# Patient Record
Sex: FEMALE | URBAN - METROPOLITAN AREA
[De-identification: names, ages, dates, MRNs, and addresses within clinical notes are randomized per-mention and may not be internally consistent; named-entity substitution may affect disease eponyms.]

---

## 2022-06-01 ENCOUNTER — APPOINTMENT (RX ONLY)
Dept: URBAN - METROPOLITAN AREA CLINIC 6 | Facility: CLINIC | Age: 67
Setting detail: DERMATOLOGY
End: 2022-06-01

## 2022-06-01 DIAGNOSIS — Z85.820 PERSONAL HISTORY OF MALIGNANT MELANOMA OF SKIN: ICD-10-CM

## 2022-06-01 PROBLEM — D37.01 NEOPLASM OF UNCERTAIN BEHAVIOR OF LIP: Status: ACTIVE | Noted: 2022-06-01

## 2022-06-01 PROCEDURE — ? ADDITIONAL NOTES

## 2022-06-01 PROCEDURE — ? BIOPSY BY SHAVE METHOD

## 2022-06-01 PROCEDURE — 99202 OFFICE O/P NEW SF 15 MIN: CPT | Mod: 25

## 2022-06-01 PROCEDURE — 40490 BIOPSY OF LIP: CPT

## 2022-06-01 PROCEDURE — ? COUNSELING

## 2022-06-01 ASSESSMENT — LOCATION ZONE DERM: LOCATION ZONE: ARM

## 2022-06-01 ASSESSMENT — LOCATION SIMPLE DESCRIPTION DERM: LOCATION SIMPLE: LEFT SHOULDER

## 2022-06-01 ASSESSMENT — LOCATION DETAILED DESCRIPTION DERM: LOCATION DETAILED: LEFT LATERAL SHOULDER

## 2022-06-01 NOTE — PROCEDURE: MIPS QUALITY
Quality 137: Melanoma: Continuity Of Care - Recall System: Patient information entered into a recall system that includes: target date for the next exam specified AND a process to follow up with patients regarding missed or unscheduled appointments
Detail Level: Detailed
Quality 226: Preventive Care And Screening: Tobacco Use: Screening And Cessation Intervention: Patient screened for tobacco use, is a smoker AND received Cessation Counseling within the Previous 12 Months
Quality 111:Pneumonia Vaccination Status For Older Adults: Pneumococcal vaccine was not administered on or after patient’s 60th birthday and before the end of the measurement period, reason not otherwise specified
Quality 130: Documentation Of Current Medications In The Medical Record: Current Medications Documented
Quality 138: Melanoma: Coordination Of Care: A treatment plan was communicated to the physicians providing continuing care within one month of diagnosis outlining: diagnosis, tumor thickness and a plan for surgery or alternate care.
Quality 397: Melanoma: Reporting: The pathology report includes a pT Category and statement on thickness and ulceration for pT1, mitotic rate.

## 2022-06-01 NOTE — PROCEDURE: BIOPSY BY SHAVE METHOD
Detail Level: Detailed
Depth Of Biopsy: dermis
Was A Bandage Applied: Yes
Size Of Lesion In Cm: 1
Biopsy Type: H and E
Biopsy Method: Dermablade
Anesthesia Type: 1% lidocaine with epinephrine
Anesthesia Volume In Cc: 0.5
Additional Anesthesia Volume In Cc (Will Not Render If 0): 0
Hemostasis: Drysol
Wound Care: Petrolatum
Dressing: bandage
Destruction After The Procedure: No
Type Of Destruction Used: Curettage
Curettage Text: The wound bed was treated with curettage after the biopsy was performed.
Cryotherapy Text: The wound bed was treated with cryotherapy after the biopsy was performed.
Electrodesiccation Text: The wound bed was treated with electrodesiccation after the biopsy was performed.
Electrodesiccation And Curettage Text: The wound bed was treated with electrodesiccation and curettage after the biopsy was performed.
Silver Nitrate Text: The wound bed was treated with silver nitrate after the biopsy was performed.
Lab: [id number]
Lab Facility: 
Consent: Written consent was obtained and risks were reviewed including but not limited to scarring, infection, bleeding, scabbing, incomplete removal, nerve damage and allergy to anesthesia.
Post-Care Instructions: I reviewed with the patient in detail post-care instructions. Patient is to keep the biopsy site dry overnight, and then apply bacitracin twice daily until healed. Patient may apply hydrogen peroxide soaks to remove any crusting.
Notification Instructions: Patient will be notified of biopsy results. However, patient instructed to call the office if not contacted within 2 weeks.
Billing Type: Third-Party Bill
Information: Selecting Yes will display possible errors in your note based on the variables you have selected. This validation is only offered as a suggestion for you. PLEASE NOTE THAT THE VALIDATION TEXT WILL BE REMOVED WHEN YOU FINALIZE YOUR NOTE. IF YOU WANT TO FAX A PRELIMINARY NOTE YOU WILL NEED TO TOGGLE THIS TO 'NO' IF YOU DO NOT WANT IT IN YOUR FAXED NOTE.

## 2022-06-01 NOTE — PROCEDURE: ADDITIONAL NOTES
Render Risk Assessment In Note?: no
Detail Level: Simple
Additional Notes: Patient had large SCC on right lower lip surgically excised by an oral surgeon in 2017. Margins were clear at that time. About 6 months ago the lesion returned and has been gradually growing.

## 2022-06-01 NOTE — HPI: SKIN LESION
Is This A New Presentation, Or A Follow-Up?: Skin Lesion
What Type Of Note Output Would You Prefer (Optional)?: Bullet Format
How Severe Is Your Skin Lesion?: severe
Has Your Skin Lesion Been Treated?: been treated
Additional History: Recurrence of previously treated SCC on right lower lip.
When Was It Treated?: 7/5/22

## 2022-06-08 ENCOUNTER — APPOINTMENT (RX ONLY)
Dept: URBAN - METROPOLITAN AREA CLINIC 6 | Facility: CLINIC | Age: 67
Setting detail: DERMATOLOGY
End: 2022-06-08

## 2022-06-08 PROBLEM — C00.1 MALIGNANT NEOPLASM OF EXTERNAL LOWER LIP: Status: ACTIVE | Noted: 2022-06-08

## 2022-06-08 PROCEDURE — ? COUNSELING

## 2022-06-08 PROCEDURE — ? DIAGNOSIS COMMENT

## 2022-06-08 PROCEDURE — 99212 OFFICE O/P EST SF 10 MIN: CPT

## 2022-06-08 NOTE — PROCEDURE: DIAGNOSIS COMMENT
Detail Level: Simple
Comment: Biopsy proven [id number]. \\nPatient was originally against having another surgery on this location, but after extensive discussion regarding high risk for metastasis given location on lip, recurrent SCC, and size of lesion, patient ultimately agreed to Mohs procedure. \\nShe states she needs “some more time to process” and also would like to have colonoscopy and PET scan performed (ordered by her PCP for positive cologuard) before focusing on treatment for this SCC.\\nReiterated importance of treating this quickly.  \\nWe will submit information to Mohs scheduling to keep this moving forward.\\nNo lymphadenopathy present on exam at today’s visit.
Render Risk Assessment In Note?: no

## 2022-06-08 NOTE — PROCEDURE: MIPS QUALITY
Detail Level: Detailed
Quality 226: Preventive Care And Screening: Tobacco Use: Screening And Cessation Intervention: Patient screened for tobacco use and is an ex/non-smoker
Quality 111:Pneumonia Vaccination Status For Older Adults: Pneumococcal vaccine was not administered on or after patient’s 60th birthday and before the end of the measurement period, reason not otherwise specified
Quality 130: Documentation Of Current Medications In The Medical Record: Current Medications Documented

## 2022-07-06 ENCOUNTER — APPOINTMENT (RX ONLY)
Dept: URBAN - METROPOLITAN AREA CLINIC 36 | Facility: CLINIC | Age: 67
Setting detail: DERMATOLOGY
End: 2022-07-06

## 2022-07-06 DIAGNOSIS — Z85.820 PERSONAL HISTORY OF MALIGNANT MELANOMA OF SKIN: ICD-10-CM

## 2022-07-06 PROBLEM — C44.92 SQUAMOUS CELL CARCINOMA OF SKIN, UNSPECIFIED: Status: ACTIVE | Noted: 2022-07-06

## 2022-07-06 PROCEDURE — ? COUNSELING

## 2022-07-06 PROCEDURE — 99214 OFFICE O/P EST MOD 30 MIN: CPT

## 2022-07-06 PROCEDURE — ? PATIENT SPECIFIC COUNSELING

## 2022-07-06 NOTE — PROCEDURE: PATIENT SPECIFIC COUNSELING
Detail Level: Zone
Mohs surgery vs radiation \\n\\nPt states she is hesitant with moving forward with surgery. Patient was advised of all the benefits risks and alternatives of her options. All questions were answered by Dr. Odom. Pt advised to move forward with radiation consultation. [facility]\\n20 minutes face to face\\nAnother 10’ talking to [facility]

## 2022-07-06 NOTE — PROCEDURE: MIPS QUALITY
Quality 138: Melanoma: Coordination Of Care: A treatment plan was communicated to the physicians providing continuing care within one month of diagnosis outlining: diagnosis, tumor thickness and a plan for surgery or alternate care.
Quality 226: Preventive Care And Screening: Tobacco Use: Screening And Cessation Intervention: Patient screened for tobacco use and is an ex/non-smoker
Quality 137: Melanoma: Continuity Of Care - Recall System: Patient information entered into a recall system that includes: target date for the next exam specified AND a process to follow up with patients regarding missed or unscheduled appointments
Detail Level: Detailed
Quality 111:Pneumonia Vaccination Status For Older Adults: Pneumococcal Vaccination Previously Received
Quality 130: Documentation Of Current Medications In The Medical Record: Current Medications Documented

## 2022-07-12 ENCOUNTER — APPOINTMENT (RX ONLY)
Dept: URBAN - METROPOLITAN AREA CLINIC 6 | Facility: CLINIC | Age: 67
Setting detail: DERMATOLOGY
End: 2022-07-12

## 2022-07-12 PROBLEM — C00.1 MALIGNANT NEOPLASM OF EXTERNAL LOWER LIP: Status: ACTIVE | Noted: 2022-07-12

## 2022-07-12 PROCEDURE — ? COUNSELING

## 2022-07-12 PROCEDURE — 99213 OFFICE O/P EST LOW 20 MIN: CPT

## 2022-07-12 PROCEDURE — ? DIAGNOSIS COMMENT

## 2022-07-12 NOTE — PROCEDURE: DIAGNOSIS COMMENT
Detail Level: Simple
Render Risk Assessment In Note?: no
Comment: Accession # [id number]. Patient has been referred to Dr. Arredondo’s office for radiation treatment.\\nPatient declines FBE as previously scheduled and would like to reschedule for another time.

## 2022-08-02 PROBLEM — C00.1: Status: ACTIVE | Noted: 2022-08-02

## 2022-08-05 ENCOUNTER — HOSPITAL ENCOUNTER (OUTPATIENT)
Dept: RADIATION ONCOLOGY | Facility: MEDICAL CENTER | Age: 67
End: 2022-08-31
Attending: RADIOLOGY
Payer: MEDICARE

## 2022-08-05 VITALS — OXYGEN SATURATION: 96 % | HEART RATE: 91 BPM | WEIGHT: 146.61 LBS

## 2022-08-05 DIAGNOSIS — C00.1 SQUAMOUS CELL CARCINOMA OF VERMILION BORDER OF LOWER LIP: ICD-10-CM

## 2022-08-05 PROCEDURE — 99214 OFFICE O/P EST MOD 30 MIN: CPT | Performed by: RADIOLOGY

## 2022-08-05 PROCEDURE — 99205 OFFICE O/P NEW HI 60 MIN: CPT | Performed by: RADIOLOGY

## 2022-08-05 NOTE — CT SIMULATION
PATIENT NAME Whitney Cotton   PRIMARY PHYSICIAN Halina Louise [id number]   REFERRING PHYSICIAN Olu Dillon M.D. 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 on 8/2/2022  Stage prefix: Initial diagnosis         Treatment Planning CT Simulation      Order Questions     Question Answer    Is this for a new course of treatment? Yes    Is this an Addendum? No    Simulation Status Initial    Treatment Technique Electron Beam    Other Technique(s) Electron    Treatment Pattern/Frequency Daily    Concurrent Chemotherapy No    CT Technique 3D    Slice Thickness 3mm    Scan Extent H&N    Treatment Device(s) Aquaplast Mask     Bolus 0.5 cm    Patient Attire Gown    Patient Position Supine    Patient Orientation Head First    Treatment Machine No preference    In Vivo Dosimetry TLD    Other Orders Weekly Physics Check

## 2022-08-05 NOTE — ADDENDUM NOTE
Encounter addended by: Simi Feliciano M.D. on: 8/5/2022 10:35 AM   Actions taken: Clinical Note Signed

## 2022-08-05 NOTE — CONSULTS
RADIATION ONCOLOGY CONSULT    DATE OF SERVICE: 8/5/2022    IDENTIFICATION: A 66 y.o. 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  She is here at the kind request of Dr. Olu Dillon.      HISTORY OF PRESENT ILLNESS: Patient's history dates back to 2017 when she was found to have squamous cell carcinoma of the lateral lower right vermilion lip.  This was excised with negative margins.  More recently it had recurred she underwent to shave biopsy on 6/1/2022.  This revealed well differentiated squamous cell carcinoma.  She was seen by Dr. Olu Farias 7/6/2022 regarding Mohs.  She is concerned about having further surgery so she was referred here for definitive management of her cancer with radiation therapy. Currently she is doing well without any major complaints.    PAST MEDICAL HISTORY:   Past Medical History:   Diagnosis Date   •  VENOUS REFLUX LEFT LEG 1/21/2011   • Arm pain     chronic lefr upper arm & lateral back pain   • Cancer (HCC)     h/o cervical.  Pt self treated w/ holistic Rx   • GERD (gastroesophageal reflux disease)    • Malignant melanoma nos     h/o s/p Sx       PAST SURGICAL HISTORY:  History of melanoma left arm treated many years ago.  Vertigo  Year arthritis in her hands  GERD  Hypertension    GYNECOLOGICAL STATUS:  G3, P3    CURRENT MEDICATIONS:  Current Outpatient Medications   Medication Sig Dispense Refill   • cyclobenzaprine (FLEXERIL) 5 MG tablet Take 1 Tab by mouth 2 times a day as needed for Mild Pain. 60 Tab 3   • hydrocodone-acetaminophen (VICODIN) 5-500 MG TABS Take 1-2 Tabs by mouth every four hours as needed for 20 doses. 20 Each 0   • ibuprofen (MOTRIN) 800 MG TABS Take 1 Tab by mouth 2 times a day as needed for Mild Pain. 60 Each 1   • ranitidine (ZANTAC) 150 MG TABS Take 1 Tab by mouth 2 times a day. 60 Each 11   • amitriptyline (ELAVIL) 25 MG TABS Take 1 Tab by mouth every evening. FOR PAIN 30 Each 3   • cyclobenzaprine (FLEXERIL) 5 MG  tablet Take 1 Tab by mouth 3 times a day as needed for Mild Pain. 30 Tab 0   • azithromycin (ZITHROMAX Z-RACHELE) 250 MG TABS Take 1 Tab by mouth every day. 2 tabs by mouth day 1, 1 tab by mouth days 2-5 1 Each 0   • AMITRIPTYLINE HCL 50 MG PO TABS Take 50 mg by mouth every evening.       No current facility-administered medications for this encounter.       ALLERGIES:    Penicillins, Asa [aspirin], Codeine, Tramadol, and Valium    FAMILY HISTORY:    [unfilled]    SOCIAL HISTORY:     reports that she has been smoking cigarettes. She started smoking about 42 years ago. She has a 5.00 pack-year smoking history. She has never used smokeless tobacco. She reports previous alcohol use. She reports current drug use. Drug: Marijuana.  Patient lives with a daughter and son and grandchildren.  She is .  She plans to move back to [place] when she is completed her treatment to live with her other family    REVIEW OF SYSTEMS: Pertinent positives consist of  alteration in taste, vertigo  The rest of the review of systems is negative and has been reviewed.     PAIN: None      PHYSICAL EXAM:    0= Fully active, able to carry on all pre-disease performance without restriction.  [unfilled]    GENERAL: Well-appearing alert and oriented x3 in no major distress.  Skin: Multiple skin changes consistent with sun exposure.  She has evidence of the very large excision on the left deltoid from the prior melanoma.  HEENT:  Pupils are equal, round, and reactive to light.  Extraocular muscles   are intact. Sclerae nonicteric.  Conjunctivae pink.  Oral cavity, tongue   protrudes midline. Lower lip shows evidence of a prior resection in the right lower vermilion lip.  There is  an associated recurrence which appears to be about 1 cm in greatest dimension.  NECK:   No peripheral adenopathy of the neck, supraclavicular fossa or axillae   bilaterally.Specifically there is no palpable lymphadenopathy in the submental or submandibular  regions.  LUNGS:  Clear to ascultation   HEART:  Regular rate and rhythm.  No murmur appreciated  ABDOMEN:  Soft. No evidence of hepatosplenomegaly.    EXTREMITIES:  Without Edema.  NEUROLOGIC:  Cranial nerves II through XII were intact. Normal stance and gait motor and sensory grossly within normal limits                IMPRESSION:    A 66 y.o. 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      RECOMMENDATIONS:   I had a long discussion with the patient regarding diagnosis prognosis and treatment.  I think radiation therapy for definitive management is reasonable.  I would like to get a PET CT scan just to make sure that this is localized only to that area since it is a recurrent lesion and there is a slight concern for metastasis to the submental and submandibular lymph nodes.  I've described the details of radiation along with the side effects both acute and chronic, including but not exclusive to fatigue, skin reaction, local soreness, swelling, delayed healing, scarring fibrosis discoloration. Ample time was allowed for questions, and patient understands.    I will see patient back in follow-up after PET CT. At that point if she decides to proceed with radiation therapy we will do so.    Thank you for the opportunity to participate in her care.  If any questions or comments, please do not hesitate in calling.    Please note that this dictation was created using voice recognition software. I have made every reasonable attempt to correct obvious errors, but I expect that there are errors of grammar and possibly content that I did not discover before finalizing the note.

## 2022-08-10 ENCOUNTER — HOSPITAL ENCOUNTER (OUTPATIENT)
Dept: RADIOLOGY | Facility: MEDICAL CENTER | Age: 67
End: 2022-08-10
Attending: RADIOLOGY
Payer: MEDICARE

## 2022-08-10 DIAGNOSIS — C00.1 SQUAMOUS CELL CARCINOMA OF VERMILION BORDER OF LOWER LIP: ICD-10-CM

## 2022-08-10 PROCEDURE — A9552 F18 FDG: HCPCS

## 2022-08-16 ENCOUNTER — APPOINTMENT (OUTPATIENT)
Dept: RADIATION ONCOLOGY | Facility: MEDICAL CENTER | Age: 67
End: 2022-08-16
Attending: RADIOLOGY
Payer: MEDICARE

## 2022-08-23 VITALS — HEART RATE: 79 BPM | OXYGEN SATURATION: 98 % | SYSTOLIC BLOOD PRESSURE: 123 MMHG | DIASTOLIC BLOOD PRESSURE: 72 MMHG

## 2022-08-23 DIAGNOSIS — C00.1 SQUAMOUS CELL CARCINOMA OF VERMILION BORDER OF LOWER LIP: ICD-10-CM

## 2022-08-23 PROCEDURE — 99212 OFFICE O/P EST SF 10 MIN: CPT | Performed by: RADIOLOGY

## 2022-08-23 PROCEDURE — 99214 OFFICE O/P EST MOD 30 MIN: CPT | Performed by: RADIOLOGY

## 2022-08-23 NOTE — NON-PROVIDER
Patient was seen today in clinic with Dr. Feliciano for follow up.  Vitals signs and weight were obtained and pain assessment was completed.  Allergies and medications were reviewed with the patient.  Toxicities of treatment assessed.     Vitals/Pain:  Vitals:    08/23/22 0808   BP: 123/72   BP Location: Left arm   Patient Position: Sitting   Pulse: 79   SpO2: 98%            Allergies:   Penicillins, Asa [aspirin], Codeine, Tramadol, and Valium    Current Medications:  Current Outpatient Medications   Medication Sig Dispense Refill    cyclobenzaprine (FLEXERIL) 5 MG tablet Take 1 Tab by mouth 2 times a day as needed for Mild Pain. 60 Tab 3    hydrocodone-acetaminophen (VICODIN) 5-500 MG TABS Take 1-2 Tabs by mouth every four hours as needed for 20 doses. 20 Each 0    ibuprofen (MOTRIN) 800 MG TABS Take 1 Tab by mouth 2 times a day as needed for Mild Pain. 60 Each 1    ranitidine (ZANTAC) 150 MG TABS Take 1 Tab by mouth 2 times a day. 60 Each 11    amitriptyline (ELAVIL) 25 MG TABS Take 1 Tab by mouth every evening. FOR PAIN 30 Each 3    cyclobenzaprine (FLEXERIL) 5 MG tablet Take 1 Tab by mouth 3 times a day as needed for Mild Pain. 30 Tab 0    azithromycin (ZITHROMAX Z-RACHELE) 250 MG TABS Take 1 Tab by mouth every day. 2 tabs by mouth day 1, 1 tab by mouth days 2-5 1 Each 0    AMITRIPTYLINE HCL 50 MG PO TABS Take 50 mg by mouth every evening.       No current facility-administered medications for this encounter.           Efrain Romero [employer]

## 2022-08-23 NOTE — PROGRESS NOTES
RADIATION ONCOLOGY FOLLOW-UP    DATE OF SERVICE: 8/23/2022    IDENTIFICATION:   A 66 y.o. female with ecurrent squamous cell carcinoma of the lateral lower right vermillion lip.    Here to review results of her PET CT scan.    HISTORY OF PRESENT ILLNESS:   Patient is doing well no new complaints.  She did have a birth in the family and a death in the family since she was last seen and had reschedule her appointment from last week.  PET CT scan done 8/10/2022 shows mild uptake in the anterior lower lip region presumably related to the area of known recurrence.  No abnormal uptake to suggest metastatic cervical lymphadenopathy.  Nonspecific uptake within the left nasopharynx with associated asymmetric tissue direct visualization is recommended.  Mild uptake in the thyroid gland probably physiologic with no focal nodules.  Uptake in the anterior mandible consistent with dental disease.  No evidence of metastatic disease in chest abdomen or pelvis.    CURRENT MEDICATIONS:  Current Outpatient Medications   Medication Sig Dispense Refill    cyclobenzaprine (FLEXERIL) 5 MG tablet Take 1 Tab by mouth 2 times a day as needed for Mild Pain. 60 Tab 3    hydrocodone-acetaminophen (VICODIN) 5-500 MG TABS Take 1-2 Tabs by mouth every four hours as needed for 20 doses. 20 Each 0    ibuprofen (MOTRIN) 800 MG TABS Take 1 Tab by mouth 2 times a day as needed for Mild Pain. 60 Each 1    ranitidine (ZANTAC) 150 MG TABS Take 1 Tab by mouth 2 times a day. 60 Each 11    amitriptyline (ELAVIL) 25 MG TABS Take 1 Tab by mouth every evening. FOR PAIN 30 Each 3    cyclobenzaprine (FLEXERIL) 5 MG tablet Take 1 Tab by mouth 3 times a day as needed for Mild Pain. 30 Tab 0    azithromycin (ZITHROMAX Z-RACHELE) 250 MG TABS Take 1 Tab by mouth every day. 2 tabs by mouth day 1, 1 tab by mouth days 2-5 1 Each 0    AMITRIPTYLINE HCL 50 MG PO TABS Take 50 mg by mouth every evening.       No current facility-administered medications for this encounter.        ALLERGIES:  Penicillins, Asa [aspirin], Codeine, Tramadol, and Valium    FAMILY HISTORY:    No family history on file.[unfilled]        SOCIAL HISTORY:     reports that she has been smoking cigarettes. She started smoking about 42 years ago. She has a 5.00 pack-year smoking history. She has never used smokeless tobacco. She reports that she does not currently use alcohol. She reports current drug use. Drug: Marijuana.    PAIN: None    REVIEW OF SYSTEMS: Is significant for Fatigue alteration in taste dental issues joint pain joint swelling and anxiety.  She has unsteady gait and she has a history of seizures she states which she treats naturally with no medication.  The rest of the review of systems has been reviewed.    PHYSICAL EXAM:     ECOG PERFORMANCE STATUS:  0= Fully active, able to carry on all pre-disease performance without restriction.   Vitals:    08/23/22 0808   BP: 123/72   BP Location: Left arm   Patient Position: Sitting   Pulse: 79   SpO2: 98%            GENERAL: Well-appearing alert and oriented x3 no apparent distress  Skin of face: Still right lower lip has evidence of tumor recurrence.  HEENT:  Pupils are equal, round, and reactive to light.  Extraocular muscles   are intact. Sclerae nonicteric.  Conjunctivae pink.  Oral cavity, tongue   protrudes midline.   NECK:  No preauricular neck supraclavicular axillary adenopathy.  LUNGS:  Clear to ascultation   HEART:  Regular rate and rhythm.  No murmur appreciated  ABDOMEN:  Soft. No evidence of hepatosplenomegaly.   EXTREMITIES:  Without Edema.  NEUROLOGIC:  Cranial nerves II through XII were intact. Normal stance and gait motor and sensory grossly within normal limits          IMPRESSION:    A 66 y.o. with ecurrent squamous cell carcinoma of the lateral lower right vermillion lip.  No evidence of metastatic disease.    RECOMMENDATIONS:     I am recommending definitive radiation therapy to the right lower lip.  Risks and benefits were discussed  again in detail and we have her tentatively scheduled for simulation next week to get started soon thereafter.        Thank you for the opportunity to participate in her care.  If any questions or comments, please do not hesitate in calling.      Please note that this dictation was created using voice recognition software. I have made every reasonable attempt to correct obvious errors, but I expect that there are errors of grammar and possibly content that I did not discover before finalizing the note.

## 2022-08-23 NOTE — CT SIMULATION
PATIENT NAME Whitney Cotton   PRIMARY PHYSICIAN Halina Louise [id number]   REFERRING PHYSICIAN Olu Dillon M.D. 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 on 8/2/2022  Stage prefix: Initial diagnosis         Treatment Planning CT Simulation        Order Questions       Question Answer    Is this for a new course of treatment? Yes    Is this an Addendum? No    Simulation Status Initial    Treatment Technique Electron Beam    Other Technique(s) Electron    Treatment Pattern/Frequency Daily    Concurrent Chemotherapy No    CT Technique 3D    Slice Thickness 3mm    Scan Extent H&N    Treatment Device(s) Aquaplast Mask     Custom    Patient Attire Gown    Patient Position Supine    Patient Orientation Head First    Treatment Machine No preference    In Vivo Dosimetry TLD    Other Orders Weekly Physics Check

## 2022-08-29 ENCOUNTER — PATIENT OUTREACH (OUTPATIENT)
Dept: ONCOLOGY | Facility: MEDICAL CENTER | Age: 67
End: 2022-08-29
Payer: MEDICARE

## 2022-08-29 ENCOUNTER — HOSPITAL ENCOUNTER (OUTPATIENT)
Dept: RADIATION ONCOLOGY | Facility: MEDICAL CENTER | Age: 67
End: 2022-08-29

## 2022-08-29 DIAGNOSIS — C00.1 SQUAMOUS CELL CARCINOMA OF VERMILION BORDER OF LOWER LIP: ICD-10-CM

## 2022-08-29 PROCEDURE — 77334 RADIATION TREATMENT AID(S): CPT | Performed by: RADIOLOGY

## 2022-08-29 PROCEDURE — 77334 RADIATION TREATMENT AID(S): CPT | Mod: 26 | Performed by: RADIOLOGY

## 2022-08-29 PROCEDURE — 77290 THER RAD SIMULAJ FIELD CPLX: CPT | Performed by: RADIOLOGY

## 2022-08-29 PROCEDURE — 77263 THER RADIOLOGY TX PLNG CPLX: CPT | Performed by: RADIOLOGY

## 2022-08-29 PROCEDURE — 77290 THER RAD SIMULAJ FIELD CPLX: CPT | Mod: 26 | Performed by: RADIOLOGY

## 2022-08-29 NOTE — PROGRESS NOTES
"[facility] Intake    Diagnosis Type:Squamous cell carcinoma of skin  Preferred Language:English  Insurance:Medicare & Medicaid  Dental Insurance:Yes; Last Appointment Date: \"about 20 years ago\"   Primary Care Provider Reviewed: yes  Last Appointment Date: \"July 2022 with Halina Louise\"  Introduced Duong Cotton to Oncology Support Services and provided contact information on 8/29/2022 .  Care team: Mariluz Rankin at [facility]   Current Barriers Identified Include: Transportation & food security  MyChart Status: Pending Activation, sent activation code via text.  Communication Preferences: Phones calls, LVM and will return call; Best Contact Number: [phone]  Patient Contact's Reviewed: yes , updated.    Met with pt. In RTD Consult Room #1 post [facility] appointment for intake. Pt. States she has family as support system. Educated pt. On [employer] web page, provided Cancer Support Group Calendar, and reviewed the Resource Center. Pt. Stated she needs gas assistance as she \"can not be a passenger with vertigo\", has questions about diet, deals with \"anxiety and depression\", and worries about food cost. Pt. Stated she receives EBT and it does not cover all food cost. Pt. Provided [facility] resource for gas mileage reimbursement log and [facility] nutrition services for nutrition advisor. Pt. Inquired if she is able to take supplements such as tumeric and collagen, during treatment. Informed pt. That I would place a referral for ONN and OSW for assessment. No further needs at this time.     Outcome:  Placed referral to ONN and OSW.         "

## 2022-09-01 ENCOUNTER — HOSPITAL ENCOUNTER (OUTPATIENT)
Dept: RADIATION ONCOLOGY | Facility: MEDICAL CENTER | Age: 67
End: 2022-09-30
Attending: RADIOLOGY
Payer: MEDICARE

## 2022-09-01 PROCEDURE — 77307 TELETHX ISODOSE PLAN CPLX: CPT | Mod: 26 | Performed by: RADIOLOGY

## 2022-09-01 PROCEDURE — 77334 RADIATION TREATMENT AID(S): CPT | Mod: 26 | Performed by: RADIOLOGY

## 2022-09-01 PROCEDURE — 77333 RADIATION TREATMENT AID(S): CPT | Mod: XU | Performed by: RADIOLOGY

## 2022-09-01 PROCEDURE — 77334 RADIATION TREATMENT AID(S): CPT | Performed by: RADIOLOGY

## 2022-09-01 PROCEDURE — 77333 RADIATION TREATMENT AID(S): CPT | Mod: 26,XU | Performed by: RADIOLOGY

## 2022-09-01 PROCEDURE — 77307 TELETHX ISODOSE PLAN CPLX: CPT | Performed by: RADIOLOGY

## 2022-09-06 ENCOUNTER — HOSPITAL ENCOUNTER (OUTPATIENT)
Dept: RADIATION ONCOLOGY | Facility: MEDICAL CENTER | Age: 67
End: 2022-09-06

## 2022-09-06 LAB
CHEMOTHERAPY INFUSION START DATE: NORMAL
CHEMOTHERAPY RECORDS: 2
CHEMOTHERAPY RECORDS: 6600
CHEMOTHERAPY RECORDS: NORMAL
CHEMOTHERAPY RX CANCER: NORMAL
DATE 1ST CHEMO CANCER: NORMAL
RAD ONC ARIA COURSE LAST TREATMENT DATE: NORMAL
RAD ONC ARIA COURSE TREATMENT ELAPSED DAYS: NORMAL
RAD ONC ARIA REFERENCE POINT DOSAGE GIVEN TO DATE: 2
RAD ONC ARIA REFERENCE POINT DOSAGE GIVEN TO DATE: 2.32
RAD ONC ARIA REFERENCE POINT ID: NORMAL
RAD ONC ARIA REFERENCE POINT ID: NORMAL
RAD ONC ARIA REFERENCE POINT SESSION DOSAGE GIVEN: 2
RAD ONC ARIA REFERENCE POINT SESSION DOSAGE GIVEN: 2.32

## 2022-09-06 PROCEDURE — 77412 RADIATION TX DELIVERY LVL 3: CPT | Performed by: RADIOLOGY

## 2022-09-06 PROCEDURE — 77280 THER RAD SIMULAJ FIELD SMPL: CPT | Performed by: RADIOLOGY

## 2022-09-06 NOTE — CT SIMULATION
DATE OF SERVICE: 9/6/2022    Radiation Therapy Episodes       Active Episodes       Radiation Therapy: Electron Beam    Radiation Therapy: Electron Beam                   Radiation Treatments         Plan Last Treated On Elapsed Days Fractions Treated Prescribed Fraction Dose (cGy) Prescribed Total Dose (cGy)    R_Lower_Lip 9/6/2022 0 @ [id number] 1 of 33 200 6,600                  Reference Point Last Treated On Elapsed Days Most Recent Session Dose (cGy) Total Dose (cGy)    R_Lower_Lip 9/6/2022 0 @ [id number] 200 200    R_Lower_Lip CP 9/6/2022 0 @ [id number] 232 232                            First Visit Simple Simulation: Called by [facility] machine to verify treatment parameters including:  treatment site, treatment dose, and treatment setup prior to first treatment. Set up derived shifts reviewed in all appropriate plains.  Shifts approved.  Patient treated.    I have personally reviewed the relevant data, performed the target localization, and determined all relevant factors for this patient’s simulation.

## 2022-09-07 ENCOUNTER — HOSPITAL ENCOUNTER (OUTPATIENT)
Dept: RADIATION ONCOLOGY | Facility: MEDICAL CENTER | Age: 67
End: 2022-09-07
Payer: MEDICARE

## 2022-09-07 VITALS — SYSTOLIC BLOOD PRESSURE: 145 MMHG | HEART RATE: 89 BPM | OXYGEN SATURATION: 96 % | DIASTOLIC BLOOD PRESSURE: 98 MMHG

## 2022-09-07 LAB
CHEMOTHERAPY INFUSION START DATE: NORMAL
CHEMOTHERAPY RECORDS: 2
CHEMOTHERAPY RECORDS: 6600
CHEMOTHERAPY RECORDS: NORMAL
CHEMOTHERAPY RX CANCER: NORMAL
DATE 1ST CHEMO CANCER: NORMAL
RAD ONC ARIA COURSE LAST TREATMENT DATE: NORMAL
RAD ONC ARIA COURSE TREATMENT ELAPSED DAYS: NORMAL
RAD ONC ARIA REFERENCE POINT DOSAGE GIVEN TO DATE: 4
RAD ONC ARIA REFERENCE POINT DOSAGE GIVEN TO DATE: 4.65
RAD ONC ARIA REFERENCE POINT ID: NORMAL
RAD ONC ARIA REFERENCE POINT ID: NORMAL
RAD ONC ARIA REFERENCE POINT SESSION DOSAGE GIVEN: 2
RAD ONC ARIA REFERENCE POINT SESSION DOSAGE GIVEN: 2.32

## 2022-09-07 PROCEDURE — 77412 RADIATION TX DELIVERY LVL 3: CPT | Performed by: RADIOLOGY

## 2022-09-07 NOTE — ON TREATMENT VISIT
ON TREATMENT  NOTE  RADIATION ONCOLOGY DEPARTMENT    Patient name:  Whitney Cotton    Primary Physician:  JIM Washington MRN: [id number]  CSN: [id number]   Referring physician:  No ref. provider found : 1955, 66 y.o.     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 on 2022  Stage prefix: Initial diagnosis      TREATMENT SUMMARY:  Aria Treatment Information          Some values may be hidden. Unless noted otherwise, only the newest values recorded on each date are displayed.           Aria Treatment Summary 22   Course First Treatment Date 2022   Course Last Treatment Date 2022   R_Lower_Lip Plan from Course C1_R_lowerlip   Fraction 2 of 33   Elapsed Course Days 1 @    Prescribed Fraction Dose 200 cGy   Prescribed Total Dose 6,600 cGy   R_Lower_Lip Reference Point from Course C1_R_lowerlip   Elapsed Course Days  @    Session Dose 200 cGy   Total Dose 400 cGy   R_Lower_Lip CP Reference Point from Course C1_R_lowerlip   Elapsed Course Days  @    Session Dose 232 cGy   Total Dose 465 cGy               SUBJECTIVE:  Tolerating therapy without event        VITAL SIGNS:  KPS: 100, Fully active, able to carry on all pre-disease performed without restriction (ECOG equivalent 0)  Encounter Vitals  Blood Pressure : (!) 145/98  Pulse: 89  Pulse Oximetry: 96 %  No flowsheet data found.       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 on 8/2/2022      PLAN:  No change in treatment plan    Disposition:  Treatment plan reviewed. Questions answered. Continue therapy outlined.     Simi Feliciano M.D.    No orders of the defined types were placed in this encounter.

## 2022-09-08 ENCOUNTER — HOSPITAL ENCOUNTER (OUTPATIENT)
Dept: RADIATION ONCOLOGY | Facility: MEDICAL CENTER | Age: 67
End: 2022-09-08
Payer: MEDICARE

## 2022-09-08 LAB
CHEMOTHERAPY INFUSION START DATE: NORMAL
CHEMOTHERAPY RECORDS: 2
CHEMOTHERAPY RECORDS: 6600
CHEMOTHERAPY RECORDS: NORMAL
CHEMOTHERAPY RX CANCER: NORMAL
DATE 1ST CHEMO CANCER: NORMAL
RAD ONC ARIA COURSE LAST TREATMENT DATE: NORMAL
RAD ONC ARIA COURSE TREATMENT ELAPSED DAYS: NORMAL
RAD ONC ARIA REFERENCE POINT DOSAGE GIVEN TO DATE: 6
RAD ONC ARIA REFERENCE POINT DOSAGE GIVEN TO DATE: 6.97
RAD ONC ARIA REFERENCE POINT ID: NORMAL
RAD ONC ARIA REFERENCE POINT ID: NORMAL
RAD ONC ARIA REFERENCE POINT SESSION DOSAGE GIVEN: 2
RAD ONC ARIA REFERENCE POINT SESSION DOSAGE GIVEN: 2.32

## 2022-09-08 PROCEDURE — 77336 RADIATION PHYSICS CONSULT: CPT | Performed by: RADIOLOGY

## 2022-09-08 PROCEDURE — 77412 RADIATION TX DELIVERY LVL 3: CPT | Performed by: RADIOLOGY

## 2022-09-09 ENCOUNTER — HOSPITAL ENCOUNTER (OUTPATIENT)
Dept: RADIATION ONCOLOGY | Facility: MEDICAL CENTER | Age: 67
End: 2022-09-09
Payer: MEDICARE

## 2022-09-09 LAB
CHEMOTHERAPY INFUSION START DATE: NORMAL
CHEMOTHERAPY RECORDS: 2
CHEMOTHERAPY RECORDS: 6600
CHEMOTHERAPY RECORDS: NORMAL
CHEMOTHERAPY RX CANCER: NORMAL
DATE 1ST CHEMO CANCER: NORMAL
RAD ONC ARIA COURSE LAST TREATMENT DATE: NORMAL
RAD ONC ARIA COURSE TREATMENT ELAPSED DAYS: NORMAL
RAD ONC ARIA REFERENCE POINT DOSAGE GIVEN TO DATE: 8
RAD ONC ARIA REFERENCE POINT DOSAGE GIVEN TO DATE: 9.3
RAD ONC ARIA REFERENCE POINT ID: NORMAL
RAD ONC ARIA REFERENCE POINT ID: NORMAL
RAD ONC ARIA REFERENCE POINT SESSION DOSAGE GIVEN: 2
RAD ONC ARIA REFERENCE POINT SESSION DOSAGE GIVEN: 2.32

## 2022-09-09 PROCEDURE — 77412 RADIATION TX DELIVERY LVL 3: CPT | Performed by: RADIOLOGY

## 2022-09-12 ENCOUNTER — PATIENT OUTREACH (OUTPATIENT)
Dept: ONCOLOGY | Facility: MEDICAL CENTER | Age: 67
End: 2022-09-12
Payer: MEDICARE

## 2022-09-12 ENCOUNTER — HOSPITAL ENCOUNTER (OUTPATIENT)
Dept: RADIATION ONCOLOGY | Facility: MEDICAL CENTER | Age: 67
End: 2022-09-12
Payer: MEDICARE

## 2022-09-12 LAB
CHEMOTHERAPY INFUSION START DATE: NORMAL
CHEMOTHERAPY RECORDS: 2
CHEMOTHERAPY RECORDS: 6600
CHEMOTHERAPY RECORDS: NORMAL
CHEMOTHERAPY RX CANCER: NORMAL
DATE 1ST CHEMO CANCER: NORMAL
RAD ONC ARIA COURSE LAST TREATMENT DATE: NORMAL
RAD ONC ARIA COURSE TREATMENT ELAPSED DAYS: NORMAL
RAD ONC ARIA REFERENCE POINT DOSAGE GIVEN TO DATE: 10
RAD ONC ARIA REFERENCE POINT DOSAGE GIVEN TO DATE: 11.62
RAD ONC ARIA REFERENCE POINT ID: NORMAL
RAD ONC ARIA REFERENCE POINT ID: NORMAL
RAD ONC ARIA REFERENCE POINT SESSION DOSAGE GIVEN: 2
RAD ONC ARIA REFERENCE POINT SESSION DOSAGE GIVEN: 2.32

## 2022-09-12 PROCEDURE — 77427 RADIATION TX MANAGEMENT X5: CPT | Performed by: RADIOLOGY

## 2022-09-12 PROCEDURE — 77412 RADIATION TX DELIVERY LVL 3: CPT | Performed by: RADIOLOGY

## 2022-09-12 NOTE — PROGRESS NOTES
Oncology Nurse Navigator (ONN) Nancy Eng reached out to patient to follow up on new referral.  No answer.  ONN left voice message with my contact information requesting a call back at patient's convenience.    INTERVENTION:  ONN introduction letter, business card & [facility] Calendar sent to patient via [facility] service.

## 2022-09-13 ENCOUNTER — DOCUMENTATION (OUTPATIENT)
Dept: RADIATION ONCOLOGY | Facility: MEDICAL CENTER | Age: 67
End: 2022-09-13
Payer: MEDICARE

## 2022-09-13 ENCOUNTER — HOSPITAL ENCOUNTER (OUTPATIENT)
Dept: RADIATION ONCOLOGY | Facility: MEDICAL CENTER | Age: 67
End: 2022-09-13
Payer: MEDICARE

## 2022-09-13 LAB
CHEMOTHERAPY INFUSION START DATE: NORMAL
CHEMOTHERAPY RECORDS: 2
CHEMOTHERAPY RECORDS: 6600
CHEMOTHERAPY RECORDS: NORMAL
CHEMOTHERAPY RX CANCER: NORMAL
DATE 1ST CHEMO CANCER: NORMAL
RAD ONC ARIA COURSE LAST TREATMENT DATE: NORMAL
RAD ONC ARIA COURSE TREATMENT ELAPSED DAYS: NORMAL
RAD ONC ARIA REFERENCE POINT DOSAGE GIVEN TO DATE: 12
RAD ONC ARIA REFERENCE POINT DOSAGE GIVEN TO DATE: 13.95
RAD ONC ARIA REFERENCE POINT ID: NORMAL
RAD ONC ARIA REFERENCE POINT ID: NORMAL
RAD ONC ARIA REFERENCE POINT SESSION DOSAGE GIVEN: 2
RAD ONC ARIA REFERENCE POINT SESSION DOSAGE GIVEN: 2.32

## 2022-09-13 PROCEDURE — 77412 RADIATION TX DELIVERY LVL 3: CPT | Performed by: RADIOLOGY

## 2022-09-13 NOTE — PROGRESS NOTES
"Nutrition Services: RD Consultation/ New Radiation Start  Whitney Cotton is a 66 y.o. 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  Pt self treated w/ holistic Rx    GERD (gastroesophageal reflux disease)     Malignant melanoma nos     h/o s/p Sx       No past surgical history on file.    RD met with pt to assess current intake, appetite, and nutritional status.  Pt presents to appointment unaccompanied. Pt states eating is a bit challenged related to taste changes s/p COVID infection last year. States is still eating and tries to buy fresh produce despite financial challenges. States did receive some information about MTM though feels it is too much paperwork for the reimbursement that she would receive for the mileage back and forth. States plan to go grocery shopping after appointment and hoping to get protein powder. Mentions is taking tumeric, collagen, and biotin and plans to show dosages to Dr. Feliciano.    Pt did not express any further nutrition-related questions or concerns at this time.     Biochemical/ Anthropometric Assessment:  Treatment Regimen: Radiation therapy (tentative completion on 10/20/22)  Pertinent Labs: reviewed, lab work from 2011  Pertinent Meds: tumeric, collagen, biotin per pt report   Wt Readings from Last 1 Encounters:   08/05/22 66.5 kg (146 lb 9.7 oz)      Ht Readings from Last 1 Encounters:   11/17/11 1.651 m (5' 5\")      BMI Readings from Last 1 Encounters:   11/17/11 26.33 kg/m²   BMI Classification: Overweight  Nutrition-Focused Physical Exam: Appears nourished and appropriate for age group       Weight History:  Wt Readings from Last 6 Encounters:   08/05/22 66.5 kg (146 lb 9.7 oz)   11/17/11 71.8 kg (158 lb 3.2 oz)   11/04/11 69.8 kg (153 lb 12.8 " oz)   02/06/11 66.2 kg (146 lb)   01/21/11 66.2 kg (146 lb)   12/10/10 66.2 kg (146 lb)     Weight Change/Malnutrition Risk: wt appears stable per chart review.     Interventions:  Introduced self and discussed role of dietitian throughout treatment process   Discussed food pantry options and mobile harvest. Pt accepts information on [facility], though states is not likely to utilize as does not want canned food. Pt declines information for Mobile harvest as does not want to drive around.   RD provided pt sample of [employer] protein powder with coupon, supported idea of smoothie given potential increased discomfort to lip as treatment progesses.  RD echoed information for [employer] nutrition program previously provided by DANIE Castro. Pt declines, believes this require membership and feels the store itself is too expensive. RD discussed nutrition services are free to community.   Provided contact info and encouraged pt     Pt reports understanding and was receptive to information provided.   RD provided contact information. Encouraged pt to reach out as questions/concerns arise.   [phone]

## 2022-09-14 ENCOUNTER — HOSPITAL ENCOUNTER (OUTPATIENT)
Dept: RADIATION ONCOLOGY | Facility: MEDICAL CENTER | Age: 67
End: 2022-09-14
Payer: MEDICARE

## 2022-09-14 VITALS — WEIGHT: 143 LBS

## 2022-09-14 LAB
CHEMOTHERAPY INFUSION START DATE: NORMAL
CHEMOTHERAPY RECORDS: 2
CHEMOTHERAPY RECORDS: 6600
CHEMOTHERAPY RECORDS: NORMAL
CHEMOTHERAPY RX CANCER: NORMAL
DATE 1ST CHEMO CANCER: NORMAL
RAD ONC ARIA COURSE LAST TREATMENT DATE: NORMAL
RAD ONC ARIA COURSE TREATMENT ELAPSED DAYS: NORMAL
RAD ONC ARIA REFERENCE POINT DOSAGE GIVEN TO DATE: 14
RAD ONC ARIA REFERENCE POINT DOSAGE GIVEN TO DATE: 16.27
RAD ONC ARIA REFERENCE POINT ID: NORMAL
RAD ONC ARIA REFERENCE POINT ID: NORMAL
RAD ONC ARIA REFERENCE POINT SESSION DOSAGE GIVEN: 2
RAD ONC ARIA REFERENCE POINT SESSION DOSAGE GIVEN: 2.32

## 2022-09-14 PROCEDURE — 77412 RADIATION TX DELIVERY LVL 3: CPT | Performed by: RADIOLOGY

## 2022-09-14 NOTE — ON TREATMENT VISIT
ON TREATMENT  NOTE  RADIATION ONCOLOGY DEPARTMENT    Patient name:  Whitney Cotton    Primary Physician:  JIM Washington MRN: [id number]  CSN: [id number]   Referring physician:  No ref. provider found : 1955, 66 y.o.     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 on 2022  Stage prefix: Initial diagnosis      TREATMENT SUMMARY:  Aria Treatment Information          Some values may be hidden. Unless noted otherwise, only the newest values recorded on each date are displayed.           Aria Treatment Summary 22   Course First Treatment Date 2022   Course Last Treatment Date 2022   R_Lower_Lip Plan from Course C1_R_lowerlip   Fraction 7 of 33   Elapsed Course Days 8 @    Prescribed Fraction Dose 200 cGy   Prescribed Total Dose 6,600 cGy   R_Lower_Lip Reference Point from Course C1_R_lowerlip   Elapsed Course Days 8 @ [id number]   Session Dose 200 cGy   Total Dose 1,400 cGy   R_Lower_Lip CP Reference Point from Course C1_R_lowerlip   Elapsed Course Days  @    Session Dose 232 cGy   Total Dose 1,627 cGy               SUBJECTIVE:  Tolerating therapy well.  She notices little bit of flattening of the tumor        VITAL SIGNS:  KPS: 100, Fully active, able to carry on all pre-disease performed without restriction (ECOG equivalent 0)  Encounter Vitals  Weight: 64.9 kg (143 lb)  No flowsheet data found.       PHYSICAL EXAM:  Slight flattening of the tumor no erythema      Toxicity Assessment 2022   Toxicity Assessment Skin Skin   Fatigue (lethargy, malaise, asthenia) None None   Radiation Dermatitis Faint erythema or dry desquamation None   Photosensitivity None None   Dry Skin Normal Normal   Alopecia Normal Normal   Erythema Multiforme (e.g.,  Wright-Miah syndrome, toxic epidermal necrolysis) Absent Absent   Nail Changes Normal Normal   Wound (Non-Infectious) None None   Wound (Infectious) - None   RT - Pain due to RT None None   Tumor Pain (onset or exacerbation of tumor pain due to treatment) None None   Skin - Late RT No change from baseline No change from baseline           IMPRESSION:  Cancer Staging  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 on 8/2/2022      PLAN:  No change in treatment plan    Disposition:  Treatment plan reviewed. Questions answered. Continue therapy outlined.     Simi Feliciano M.D.    No orders of the defined types were placed in this encounter.

## 2022-09-15 ENCOUNTER — HOSPITAL ENCOUNTER (OUTPATIENT)
Dept: RADIATION ONCOLOGY | Facility: MEDICAL CENTER | Age: 67
End: 2022-09-15
Payer: MEDICARE

## 2022-09-15 LAB
CHEMOTHERAPY INFUSION START DATE: NORMAL
CHEMOTHERAPY RECORDS: 2
CHEMOTHERAPY RECORDS: 6600
CHEMOTHERAPY RECORDS: NORMAL
CHEMOTHERAPY RX CANCER: NORMAL
DATE 1ST CHEMO CANCER: NORMAL
RAD ONC ARIA COURSE LAST TREATMENT DATE: NORMAL
RAD ONC ARIA COURSE TREATMENT ELAPSED DAYS: NORMAL
RAD ONC ARIA REFERENCE POINT DOSAGE GIVEN TO DATE: 16
RAD ONC ARIA REFERENCE POINT DOSAGE GIVEN TO DATE: 18.6
RAD ONC ARIA REFERENCE POINT ID: NORMAL
RAD ONC ARIA REFERENCE POINT ID: NORMAL
RAD ONC ARIA REFERENCE POINT SESSION DOSAGE GIVEN: 2
RAD ONC ARIA REFERENCE POINT SESSION DOSAGE GIVEN: 2.32

## 2022-09-15 PROCEDURE — 77336 RADIATION PHYSICS CONSULT: CPT | Performed by: RADIOLOGY

## 2022-09-15 PROCEDURE — 77412 RADIATION TX DELIVERY LVL 3: CPT | Performed by: RADIOLOGY

## 2022-09-16 ENCOUNTER — HOSPITAL ENCOUNTER (OUTPATIENT)
Dept: RADIATION ONCOLOGY | Facility: MEDICAL CENTER | Age: 67
End: 2022-09-16
Payer: MEDICARE

## 2022-09-16 LAB
CHEMOTHERAPY INFUSION START DATE: NORMAL
CHEMOTHERAPY RECORDS: 2
CHEMOTHERAPY RECORDS: 6600
CHEMOTHERAPY RECORDS: NORMAL
CHEMOTHERAPY RX CANCER: NORMAL
DATE 1ST CHEMO CANCER: NORMAL
RAD ONC ARIA COURSE LAST TREATMENT DATE: NORMAL
RAD ONC ARIA COURSE TREATMENT ELAPSED DAYS: NORMAL
RAD ONC ARIA REFERENCE POINT DOSAGE GIVEN TO DATE: 18
RAD ONC ARIA REFERENCE POINT DOSAGE GIVEN TO DATE: 20.92
RAD ONC ARIA REFERENCE POINT ID: NORMAL
RAD ONC ARIA REFERENCE POINT ID: NORMAL
RAD ONC ARIA REFERENCE POINT SESSION DOSAGE GIVEN: 2
RAD ONC ARIA REFERENCE POINT SESSION DOSAGE GIVEN: 2.32

## 2022-09-16 PROCEDURE — 77412 RADIATION TX DELIVERY LVL 3: CPT | Performed by: RADIOLOGY

## 2022-09-19 ENCOUNTER — HOSPITAL ENCOUNTER (OUTPATIENT)
Dept: RADIATION ONCOLOGY | Facility: MEDICAL CENTER | Age: 67
End: 2022-09-19

## 2022-09-19 VITALS — SYSTOLIC BLOOD PRESSURE: 145 MMHG | HEART RATE: 89 BPM | OXYGEN SATURATION: 96 % | DIASTOLIC BLOOD PRESSURE: 98 MMHG

## 2022-09-19 DIAGNOSIS — C00.1 SQUAMOUS CELL CARCINOMA OF VERMILION BORDER OF LOWER LIP: ICD-10-CM

## 2022-09-19 PROBLEM — Z85.41 HISTORY OF CERVICAL CANCER: Status: ACTIVE | Noted: 2017-06-02

## 2022-09-19 LAB
CHEMOTHERAPY INFUSION START DATE: NORMAL
CHEMOTHERAPY RECORDS: 2
CHEMOTHERAPY RECORDS: 6600
CHEMOTHERAPY RECORDS: NORMAL
CHEMOTHERAPY RX CANCER: NORMAL
DATE 1ST CHEMO CANCER: NORMAL
RAD ONC ARIA COURSE LAST TREATMENT DATE: NORMAL
RAD ONC ARIA COURSE TREATMENT ELAPSED DAYS: NORMAL
RAD ONC ARIA REFERENCE POINT DOSAGE GIVEN TO DATE: 20
RAD ONC ARIA REFERENCE POINT DOSAGE GIVEN TO DATE: 23.25
RAD ONC ARIA REFERENCE POINT ID: NORMAL
RAD ONC ARIA REFERENCE POINT ID: NORMAL
RAD ONC ARIA REFERENCE POINT SESSION DOSAGE GIVEN: 2
RAD ONC ARIA REFERENCE POINT SESSION DOSAGE GIVEN: 2.32

## 2022-09-19 PROCEDURE — 77427 RADIATION TX MANAGEMENT X5: CPT | Performed by: RADIOLOGY

## 2022-09-19 PROCEDURE — 77412 RADIATION TX DELIVERY LVL 3: CPT | Performed by: RADIOLOGY

## 2022-09-19 RX ORDER — LIDOCAINE HYDROCHLORIDE 20 MG/ML
SOLUTION OROPHARYNGEAL
Qty: 200 ML | Refills: 3 | Status: SHIPPED | OUTPATIENT
Start: 2022-09-19 | End: 2022-10-05 | Stop reason: SDUPTHER

## 2022-09-19 RX ORDER — IBUPROFEN 800 MG/1
800 TABLET ORAL EVERY 8 HOURS PRN
Qty: 30 TABLET | Refills: 3 | Status: SHIPPED | OUTPATIENT
Start: 2022-09-19

## 2022-09-19 NOTE — ON TREATMENT VISIT
ON TREATMENT  NOTE  RADIATION ONCOLOGY DEPARTMENT    Patient name:  Whitney Cotton    Primary Physician:  JIM Washington MRN: [id number]  CSN: [id number]   Referring physician:  No ref. provider found : 1955, 67 y.o.     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 on 2022  Stage prefix: Initial diagnosis      TREATMENT SUMMARY:  Aria Treatment Information          Some values may be hidden. Unless noted otherwise, only the newest values recorded on each date are displayed.           Aria Treatment Summary 22   Course First Treatment Date 2022   Course Last Treatment Date 2022   R_Lower_Lip Plan from Course C1_R_lowerlip   Fraction 10 of 33   Elapsed Course Days  @    Prescribed Fraction Dose 200 cGy   Prescribed Total Dose 6,600 cGy   R_Lower_Lip Reference Point from Course C1_R_lowerlip   Elapsed Course Days  @    Session Dose 200 cGy   Total Dose 2,000 cGy   R_Lower_Lip CP Reference Point from Course C1_R_lowerlip   Elapsed Course Days    Session Dose 232 cGy   Total Dose 2,325 cGy               SUBJECTIVE:  Swollen lip, sore        VITAL SIGNS:  KPS: 90, Able to carry on normal activity; minor signs or symptoms of disease (ECOG equivalent 0)     No flowsheet data found.       PHYSICAL EXAM:  Brisk mucositis, swollen lip      Toxicity Assessment 2022   Toxicity Assessment Skin Skin   Fatigue (lethargy, malaise, asthenia) None None   Radiation Dermatitis Faint erythema or dry desquamation None   Photosensitivity None None   Dry Skin Normal Normal   Alopecia Normal Normal   Erythema Multiforme (e.g., Wright-Miah syndrome, toxic epidermal necrolysis) Absent Absent   Nail Changes Normal Normal   Wound (Non-Infectious)  None None   Wound (Infectious) - None   RT - Pain due to RT None None   Tumor Pain (onset or exacerbation of tumor pain due to treatment) None None   Skin - Late RT No change from baseline No change from baseline           IMPRESSION:  Cancer Staging  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 on 8/2/2022      PLAN:  No change in treatment plan    Disposition:  Treatment plan reviewed. Questions answered. Continue therapy outlined.     Simi Feliciano M.D.    Orders Placed This Encounter    Rad Onc Aria Session Summary    lidocaine (XYLOCAINE) 2 % Solution    ibuprofen (MOTRIN) 800 MG Tab

## 2022-09-20 ENCOUNTER — HOSPITAL ENCOUNTER (OUTPATIENT)
Dept: RADIATION ONCOLOGY | Facility: MEDICAL CENTER | Age: 67
End: 2022-09-20
Payer: MEDICARE

## 2022-09-20 LAB
CHEMOTHERAPY INFUSION START DATE: NORMAL
CHEMOTHERAPY RECORDS: 2
CHEMOTHERAPY RECORDS: 6600
CHEMOTHERAPY RECORDS: NORMAL
CHEMOTHERAPY RX CANCER: NORMAL
DATE 1ST CHEMO CANCER: NORMAL
RAD ONC ARIA COURSE LAST TREATMENT DATE: NORMAL
RAD ONC ARIA COURSE TREATMENT ELAPSED DAYS: NORMAL
RAD ONC ARIA REFERENCE POINT DOSAGE GIVEN TO DATE: 22
RAD ONC ARIA REFERENCE POINT DOSAGE GIVEN TO DATE: 25.57
RAD ONC ARIA REFERENCE POINT ID: NORMAL
RAD ONC ARIA REFERENCE POINT ID: NORMAL
RAD ONC ARIA REFERENCE POINT SESSION DOSAGE GIVEN: 2
RAD ONC ARIA REFERENCE POINT SESSION DOSAGE GIVEN: 2.32

## 2022-09-20 PROCEDURE — 77412 RADIATION TX DELIVERY LVL 3: CPT | Performed by: RADIOLOGY

## 2022-09-20 NOTE — ON TREATMENT VISIT
ON TREATMENT  NOTE  RADIATION ONCOLOGY DEPARTMENT    Patient name:  Whitney Cotton    Primary Physician:  JIM Washington MRN: [id number]  CSN: [id number]   Referring physician:  No ref. provider found : 1955, 67 y.o.     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 on 2022  Stage prefix: Initial diagnosis      TREATMENT SUMMARY:  Aria Treatment Information          Some values may be hidden. Unless noted otherwise, only the newest values recorded on each date are displayed.           Aria Treatment Summary 22   Course First Treatment Date 2022   Course Last Treatment Date 2022   R_Lower_Lip Plan from Course C1_R_lowerlip   Fraction 11 of 33   Elapsed Course Days    Prescribed Fraction Dose 200 cGy   Prescribed Total Dose 6,600 cGy   R_Lower_Lip Reference Point from Course C1_R_lowerlip   Elapsed Course Days  @    Session Dose 200 cGy   Total Dose 2,200 cGy   R_Lower_Lip CP Reference Point from Course C1_R_lowerlip   Elapsed Course Days    Session Dose 232 cGy   Total Dose 2,557 cGy               SUBJECTIVE:  Much better today after gargling with the salt water and putting a little lidocaine on the lip.  She also took 800 mg of Motrin and the swelling substantially lessened        VITAL SIGNS:  KPS: 100, Fully active, able to carry on all pre-disease performed without restriction (ECOG equivalent 0)     No flowsheet data found.       PHYSICAL EXAM:  Decrease in swelling decrease in mucositis      Toxicity Assessment 2022   Toxicity Assessment Skin Skin Skin   Fatigue (lethargy, malaise, asthenia) Moderate (e.g., decrease in performance status by 1 ECOG level or 20% Karnofsky) or causing difficulty performing  some activities None None   Radiation Dermatitis Moderate to brisk erythema or a patchy moist desquamation, mostly confined to skin folds and creases, with/without moderate edema Faint erythema or dry desquamation None   Photosensitivity None None None   Dry Skin Normal Normal Normal   Alopecia Normal Normal Normal   Erythema Multiforme (e.g., Wright-Miah syndrome, toxic epidermal necrolysis) Absent Absent Absent   Nail Changes Normal Normal Normal   Wound (Non-Infectious) None None None   Wound (Infectious) None - None   RT - Pain due to RT Moderate pain, pain or analgesics interfering with function, but not interfering with activities of daily living None None   Tumor Pain (onset or exacerbation of tumor pain due to treatment) Moderate pain, pain or analgesics interfering with function, but not interfering with activities of daily living None None   Skin - Late RT Slight atrophy, pigmentation change, some hair loss No change from baseline No change from baseline           IMPRESSION:  Cancer Staging  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 on 8/2/2022      PLAN:  No change in treatment plan    Disposition:  Treatment plan reviewed. Questions answered. Continue therapy outlined.     Simi Feliciano M.D.    No orders of the defined types were placed in this encounter.

## 2022-09-21 ENCOUNTER — APPOINTMENT (OUTPATIENT)
Dept: RADIATION ONCOLOGY | Facility: MEDICAL CENTER | Age: 67
End: 2022-09-21
Attending: RADIOLOGY
Payer: MEDICARE

## 2022-09-21 ENCOUNTER — HOSPITAL ENCOUNTER (OUTPATIENT)
Dept: RADIATION ONCOLOGY | Facility: MEDICAL CENTER | Age: 67
End: 2022-09-21
Payer: MEDICARE

## 2022-09-21 LAB
CHEMOTHERAPY INFUSION START DATE: NORMAL
CHEMOTHERAPY RECORDS: 2
CHEMOTHERAPY RECORDS: 6600
CHEMOTHERAPY RECORDS: NORMAL
CHEMOTHERAPY RX CANCER: NORMAL
DATE 1ST CHEMO CANCER: NORMAL
RAD ONC ARIA COURSE LAST TREATMENT DATE: NORMAL
RAD ONC ARIA COURSE TREATMENT ELAPSED DAYS: NORMAL
RAD ONC ARIA REFERENCE POINT DOSAGE GIVEN TO DATE: 24
RAD ONC ARIA REFERENCE POINT DOSAGE GIVEN TO DATE: 27.9
RAD ONC ARIA REFERENCE POINT ID: NORMAL
RAD ONC ARIA REFERENCE POINT ID: NORMAL
RAD ONC ARIA REFERENCE POINT SESSION DOSAGE GIVEN: 2
RAD ONC ARIA REFERENCE POINT SESSION DOSAGE GIVEN: 2.32

## 2022-09-21 PROCEDURE — 77412 RADIATION TX DELIVERY LVL 3: CPT | Performed by: RADIOLOGY

## 2022-09-22 ENCOUNTER — HOSPITAL ENCOUNTER (OUTPATIENT)
Dept: RADIATION ONCOLOGY | Facility: MEDICAL CENTER | Age: 67
End: 2022-09-22
Payer: MEDICARE

## 2022-09-22 LAB
CHEMOTHERAPY INFUSION START DATE: NORMAL
CHEMOTHERAPY RECORDS: 2
CHEMOTHERAPY RECORDS: 6600
CHEMOTHERAPY RECORDS: NORMAL
CHEMOTHERAPY RX CANCER: NORMAL
DATE 1ST CHEMO CANCER: NORMAL
RAD ONC ARIA COURSE LAST TREATMENT DATE: NORMAL
RAD ONC ARIA COURSE TREATMENT ELAPSED DAYS: NORMAL
RAD ONC ARIA REFERENCE POINT DOSAGE GIVEN TO DATE: 26
RAD ONC ARIA REFERENCE POINT DOSAGE GIVEN TO DATE: 30.22
RAD ONC ARIA REFERENCE POINT ID: NORMAL
RAD ONC ARIA REFERENCE POINT ID: NORMAL
RAD ONC ARIA REFERENCE POINT SESSION DOSAGE GIVEN: 2
RAD ONC ARIA REFERENCE POINT SESSION DOSAGE GIVEN: 2.32

## 2022-09-22 PROCEDURE — 77412 RADIATION TX DELIVERY LVL 3: CPT | Performed by: RADIOLOGY

## 2022-09-22 PROCEDURE — 77336 RADIATION PHYSICS CONSULT: CPT | Performed by: RADIOLOGY

## 2022-09-23 ENCOUNTER — HOSPITAL ENCOUNTER (OUTPATIENT)
Dept: RADIATION ONCOLOGY | Facility: MEDICAL CENTER | Age: 67
End: 2022-09-23
Payer: MEDICARE

## 2022-09-23 ENCOUNTER — PATIENT OUTREACH (OUTPATIENT)
Dept: ONCOLOGY | Facility: MEDICAL CENTER | Age: 67
End: 2022-09-23
Payer: MEDICARE

## 2022-09-23 LAB
CHEMOTHERAPY INFUSION START DATE: NORMAL
CHEMOTHERAPY RECORDS: 2
CHEMOTHERAPY RECORDS: 6600
CHEMOTHERAPY RECORDS: NORMAL
CHEMOTHERAPY RX CANCER: NORMAL
DATE 1ST CHEMO CANCER: NORMAL
RAD ONC ARIA COURSE LAST TREATMENT DATE: NORMAL
RAD ONC ARIA COURSE TREATMENT ELAPSED DAYS: NORMAL
RAD ONC ARIA REFERENCE POINT DOSAGE GIVEN TO DATE: 28
RAD ONC ARIA REFERENCE POINT DOSAGE GIVEN TO DATE: 32.55
RAD ONC ARIA REFERENCE POINT ID: NORMAL
RAD ONC ARIA REFERENCE POINT ID: NORMAL
RAD ONC ARIA REFERENCE POINT SESSION DOSAGE GIVEN: 2
RAD ONC ARIA REFERENCE POINT SESSION DOSAGE GIVEN: 2.32

## 2022-09-23 PROCEDURE — 77412 RADIATION TX DELIVERY LVL 3: CPT | Performed by: RADIOLOGY

## 2022-09-23 NOTE — LETTER
[facility]   [address]  [place] [address]  Phone: [phone] - Fax: [phone]            Ms. Duong Velazquez Lisbetmaria alejandra  [address] [place] [address]     Date: 09/23/22      Dear Duong,    I am a Cancer Nurse Navigator, a certified oncology nurse. My role is to assess any needs you may have with education, guidance and support. I am available to you and your family from diagnosis through your survivorship.       I am available to address your needs during your journey with the following services:     Care Coordination  I can assist you in facilitating communication between your cancer care treatment team to ensure timely treatment and follow-up.  I can also assist with transition of care back to your primary care provider, or other specialist, as needed.  My goal is to bridge gaps for you throughout the course of your active treatment.       Education Services  Understanding the recommended treatment course by your physician is key. I can provide educational resources personalized to your cancer diagnosis to help you understand your diagnosis and treatment. Please let me know if you would like to receive information about your diagnosis and treatment plan.  I am here to help.     Support Services/Resource Information  [facility] we offer a full scope of support services.  I can assist you with referral information to:  · Cancer Clinical Trials & Research  · Nutrition counseling  · Support groups  · Complementary Therapies such as Healing Touch and Mind-Body Techniques Meditation  · Patient Financial Advocates  ·   · [facility] [place], an [employer] affiliate office, our volunteers can assist you with accessing our [employer] library, support services information, head coverings and comfort items  · Community and national resources, included eligibility based chalino assistance and pharmaceutical access programs, if you  are in need of additional information.     [facility] offers services that include:  · Behavioral Health  · Genetic counseling & testing  · Acupuncture  · Lymphedema prevention/treatment program  · Palliative care services.          I hope you have an excellent patient experience.  Please feel free to share with me your comments regarding the care you have received- we value your feedback.    Sincerely,     Evelin Eng R.N.  Cancer Nurse Navigator  Office: [phone]   Email:  [email or web address]

## 2022-09-23 NOTE — PROGRESS NOTES
Oncology Nurse Navigator (ONN) Nancy Eng reached out to patient to follow up on previous phone call & ONN referral.  No answer.  ONN left voice message with my contact information requesting a call back at patient's convenience.    INTERVENTION:  ONN introduction letter and [facility] Cancer Support Services Calendar sent to patient via [employer] on 9/23/2022 as ONN unable to reach patient x 2 phone calls.

## 2022-09-23 NOTE — PROGRESS NOTES
Oncology Nurse Navigator (ONN) Nancy Eng reached out to patient to follow up on new referral.  No answer.  ONN left voice message with my contact information requesting a call back at patient's convenience.    INTERVENTION:  ONN introduction letter, business card & [facility] Calendar sent to patient via [facility] service.

## 2022-09-23 NOTE — PROGRESS NOTES
On September 23, 2022, Oncology Social Worker Franci Johns attempted telephone contact with pt.  OSW Andreas left voicemail message for pt. requesting pt. call back at earliest convenience.  OSW Andreas left contact information in voicemail message.

## 2022-09-26 ENCOUNTER — HOSPITAL ENCOUNTER (OUTPATIENT)
Dept: RADIATION ONCOLOGY | Facility: MEDICAL CENTER | Age: 67
End: 2022-09-26
Payer: MEDICARE

## 2022-09-26 LAB
CHEMOTHERAPY INFUSION START DATE: NORMAL
CHEMOTHERAPY RECORDS: 2
CHEMOTHERAPY RECORDS: 6600
CHEMOTHERAPY RECORDS: NORMAL
CHEMOTHERAPY RX CANCER: NORMAL
DATE 1ST CHEMO CANCER: NORMAL
RAD ONC ARIA COURSE LAST TREATMENT DATE: NORMAL
RAD ONC ARIA COURSE TREATMENT ELAPSED DAYS: NORMAL
RAD ONC ARIA REFERENCE POINT DOSAGE GIVEN TO DATE: 30
RAD ONC ARIA REFERENCE POINT DOSAGE GIVEN TO DATE: 34.87
RAD ONC ARIA REFERENCE POINT ID: NORMAL
RAD ONC ARIA REFERENCE POINT ID: NORMAL
RAD ONC ARIA REFERENCE POINT SESSION DOSAGE GIVEN: 2
RAD ONC ARIA REFERENCE POINT SESSION DOSAGE GIVEN: 2.32

## 2022-09-26 PROCEDURE — 77427 RADIATION TX MANAGEMENT X5: CPT | Performed by: RADIOLOGY

## 2022-09-26 PROCEDURE — 77412 RADIATION TX DELIVERY LVL 3: CPT | Performed by: RADIOLOGY

## 2022-09-26 NOTE — PROGRESS NOTES
"Pt. Walk-in for assistance with MyChart. Pt. Stated she will complete process when she is at home in front of a computer. Pt. Also stated she received a voice message from \"\" on Friday and would return call today, she requested I forward this message to OSW Franci.   No further needs at this time.   "

## 2022-09-27 ENCOUNTER — HOSPITAL ENCOUNTER (OUTPATIENT)
Dept: RADIATION ONCOLOGY | Facility: MEDICAL CENTER | Age: 67
End: 2022-09-27
Payer: MEDICARE

## 2022-09-27 LAB
CHEMOTHERAPY INFUSION START DATE: NORMAL
CHEMOTHERAPY RECORDS: 2
CHEMOTHERAPY RECORDS: 6600
CHEMOTHERAPY RECORDS: NORMAL
CHEMOTHERAPY RX CANCER: NORMAL
DATE 1ST CHEMO CANCER: NORMAL
RAD ONC ARIA COURSE LAST TREATMENT DATE: NORMAL
RAD ONC ARIA COURSE TREATMENT ELAPSED DAYS: NORMAL
RAD ONC ARIA REFERENCE POINT DOSAGE GIVEN TO DATE: 32
RAD ONC ARIA REFERENCE POINT DOSAGE GIVEN TO DATE: 37.19
RAD ONC ARIA REFERENCE POINT ID: NORMAL
RAD ONC ARIA REFERENCE POINT ID: NORMAL
RAD ONC ARIA REFERENCE POINT SESSION DOSAGE GIVEN: 2
RAD ONC ARIA REFERENCE POINT SESSION DOSAGE GIVEN: 2.32

## 2022-09-27 PROCEDURE — 77412 RADIATION TX DELIVERY LVL 3: CPT | Performed by: RADIOLOGY

## 2022-09-28 ENCOUNTER — HOSPITAL ENCOUNTER (OUTPATIENT)
Dept: RADIATION ONCOLOGY | Facility: MEDICAL CENTER | Age: 67
End: 2022-09-28
Payer: MEDICARE

## 2022-09-28 VITALS — SYSTOLIC BLOOD PRESSURE: 150 MMHG | DIASTOLIC BLOOD PRESSURE: 70 MMHG | HEART RATE: 72 BPM | OXYGEN SATURATION: 97 %

## 2022-09-28 LAB
CHEMOTHERAPY INFUSION START DATE: NORMAL
CHEMOTHERAPY RECORDS: 2
CHEMOTHERAPY RECORDS: 6600
CHEMOTHERAPY RECORDS: NORMAL
CHEMOTHERAPY RX CANCER: NORMAL
DATE 1ST CHEMO CANCER: NORMAL
RAD ONC ARIA COURSE LAST TREATMENT DATE: NORMAL
RAD ONC ARIA COURSE TREATMENT ELAPSED DAYS: NORMAL
RAD ONC ARIA REFERENCE POINT DOSAGE GIVEN TO DATE: 34
RAD ONC ARIA REFERENCE POINT DOSAGE GIVEN TO DATE: 39.52
RAD ONC ARIA REFERENCE POINT ID: NORMAL
RAD ONC ARIA REFERENCE POINT ID: NORMAL
RAD ONC ARIA REFERENCE POINT SESSION DOSAGE GIVEN: 2
RAD ONC ARIA REFERENCE POINT SESSION DOSAGE GIVEN: 2.32

## 2022-09-28 PROCEDURE — 77412 RADIATION TX DELIVERY LVL 3: CPT | Performed by: RADIOLOGY

## 2022-09-28 NOTE — ON TREATMENT VISIT
ON TREATMENT  NOTE  RADIATION ONCOLOGY DEPARTMENT    Patient name:  Whitney Cotton    Primary Physician:  JIM Washington MRN: [id number]  CSN: [id number]   Referring physician:  No ref. provider found : 1955, 67 y.o.     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 on 2022  Stage prefix: Initial diagnosis      TREATMENT SUMMARY:  Aria Treatment Information          Some values may be hidden. Unless noted otherwise, only the newest values recorded on each date are displayed.           Aria Treatment Summary 22   Course First Treatment Date 2022   Course Last Treatment Date 2022   R_Lower_Lip Plan from Course C1_R_lowerlip   Fraction 17 of 33   Elapsed Course Days  @    Prescribed Fraction Dose 200 cGy   Prescribed Total Dose 6,600 cGy   R_Lower_Lip Reference Point from Course C1_R_lowerlip   Elapsed Course Days  @ [id number]   Session Dose 200 cGy   Total Dose 3,400 cGy   R_Lower_Lip CP Reference Point from Course C1_R_lowerlip   Elapsed Course Days 20221100   Session Dose 232 cGy   Total Dose 3,952 cGy               SUBJECTIVE:  Tolerating therapy fairly well.  She still has a lot of swelling and mucositis in the field but the tumor appears to be flattening out.  She is taking Motrin and using topical agents for pain control.        VITAL SIGNS:  KPS: 90, Able to carry on normal activity; minor signs or symptoms of disease (ECOG equivalent 0)  Encounter Vitals  Blood Pressure : (!) 150/70  Pulse: 72  Pulse Oximetry: 97 %  No flowsheet data found.       PHYSICAL EXAM:  Brisk erythematous reaction with associated mucositis.  Tumor is flattening out.      Toxicity Assessment 2022   Toxicity Assessment Skin Skin Skin Skin    Fatigue (lethargy, malaise, asthenia) Increased fatigue over baseline, but not altering normal activities Moderate (e.g., decrease in performance status by 1 ECOG level or 20% Karnofsky) or causing difficulty performing some activities None None   Radiation Dermatitis Moderate to brisk erythema or a patchy moist desquamation, mostly confined to skin folds and creases, with/without moderate edema Moderate to brisk erythema or a patchy moist desquamation, mostly confined to skin folds and creases, with/without moderate edema Faint erythema or dry desquamation None   Photosensitivity None None None None   Dry Skin Normal Normal Normal Normal   Alopecia Normal Normal Normal Normal   Erythema Multiforme (e.g., Wright-Miah syndrome, toxic epidermal necrolysis) Absent Absent Absent Absent   Nail Changes Discoloration or ridging (koilonychia) or pitting Normal Normal Normal   Wound (Non-Infectious) Fascial disruption without evisceration None None None   Wound (Infectious) None None - None   RT - Pain due to RT Moderate pain, pain or analgesics interfering with function, but not interfering with activities of daily living Moderate pain, pain or analgesics interfering with function, but not interfering with activities of daily living None None   Tumor Pain (onset or exacerbation of tumor pain due to treatment) Moderate pain, pain or analgesics interfering with function, but not interfering with activities of daily living Moderate pain, pain or analgesics interfering with function, but not interfering with activities of daily living None None   Skin - Late RT Patchy atrophy, moderate telangiectasia, total hair loss Slight atrophy, pigmentation change, some hair loss No change from baseline No change from baseline           IMPRESSION:  Cancer Staging  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 on 8/2/2022      PLAN:  No change in treatment plan    Disposition:  Treatment plan reviewed. Questions answered. Continue therapy outlined.     Simi Feliciano M.D.    No orders of the defined types were placed in this encounter.

## 2022-09-29 ENCOUNTER — HOSPITAL ENCOUNTER (OUTPATIENT)
Dept: RADIATION ONCOLOGY | Facility: MEDICAL CENTER | Age: 67
End: 2022-09-29
Payer: MEDICARE

## 2022-09-29 LAB
CHEMOTHERAPY INFUSION START DATE: NORMAL
CHEMOTHERAPY RECORDS: 2
CHEMOTHERAPY RECORDS: 6600
CHEMOTHERAPY RECORDS: NORMAL
CHEMOTHERAPY RX CANCER: NORMAL
DATE 1ST CHEMO CANCER: NORMAL
RAD ONC ARIA COURSE LAST TREATMENT DATE: NORMAL
RAD ONC ARIA COURSE TREATMENT ELAPSED DAYS: NORMAL
RAD ONC ARIA REFERENCE POINT DOSAGE GIVEN TO DATE: 36
RAD ONC ARIA REFERENCE POINT DOSAGE GIVEN TO DATE: 41.84
RAD ONC ARIA REFERENCE POINT ID: NORMAL
RAD ONC ARIA REFERENCE POINT ID: NORMAL
RAD ONC ARIA REFERENCE POINT SESSION DOSAGE GIVEN: 2
RAD ONC ARIA REFERENCE POINT SESSION DOSAGE GIVEN: 2.32

## 2022-09-29 PROCEDURE — 77336 RADIATION PHYSICS CONSULT: CPT | Performed by: RADIOLOGY

## 2022-09-29 PROCEDURE — 77412 RADIATION TX DELIVERY LVL 3: CPT | Performed by: RADIOLOGY

## 2022-09-30 ENCOUNTER — HOSPITAL ENCOUNTER (OUTPATIENT)
Dept: RADIATION ONCOLOGY | Facility: MEDICAL CENTER | Age: 67
End: 2022-09-30
Payer: MEDICARE

## 2022-09-30 LAB
CHEMOTHERAPY INFUSION START DATE: NORMAL
CHEMOTHERAPY RECORDS: 2
CHEMOTHERAPY RECORDS: 6600
CHEMOTHERAPY RECORDS: NORMAL
CHEMOTHERAPY RX CANCER: NORMAL
DATE 1ST CHEMO CANCER: NORMAL
RAD ONC ARIA COURSE LAST TREATMENT DATE: NORMAL
RAD ONC ARIA COURSE TREATMENT ELAPSED DAYS: NORMAL
RAD ONC ARIA REFERENCE POINT DOSAGE GIVEN TO DATE: 38
RAD ONC ARIA REFERENCE POINT DOSAGE GIVEN TO DATE: 44.17
RAD ONC ARIA REFERENCE POINT ID: NORMAL
RAD ONC ARIA REFERENCE POINT ID: NORMAL
RAD ONC ARIA REFERENCE POINT SESSION DOSAGE GIVEN: 2
RAD ONC ARIA REFERENCE POINT SESSION DOSAGE GIVEN: 2.32

## 2022-09-30 PROCEDURE — 77412 RADIATION TX DELIVERY LVL 3: CPT | Performed by: RADIOLOGY

## 2022-10-03 ENCOUNTER — HOSPITAL ENCOUNTER (OUTPATIENT)
Dept: RADIATION ONCOLOGY | Facility: MEDICAL CENTER | Age: 67
End: 2022-10-31
Attending: RADIOLOGY
Payer: MEDICARE

## 2022-10-03 ENCOUNTER — HOSPITAL ENCOUNTER (OUTPATIENT)
Dept: RADIATION ONCOLOGY | Facility: MEDICAL CENTER | Age: 67
End: 2022-10-03
Payer: MEDICARE

## 2022-10-03 DIAGNOSIS — C00.1: ICD-10-CM

## 2022-10-03 RX ORDER — HYDROCODONE BITARTRATE AND ACETAMINOPHEN 5; 325 MG/1; MG/1
1 TABLET ORAL EVERY 4 HOURS PRN
Qty: 56 TABLET | Refills: 0 | Status: SHIPPED | OUTPATIENT
Start: 2022-10-03 | End: 2022-10-17

## 2022-10-05 ENCOUNTER — HOSPITAL ENCOUNTER (OUTPATIENT)
Dept: RADIATION ONCOLOGY | Facility: MEDICAL CENTER | Age: 67
End: 2022-10-05
Payer: MEDICARE

## 2022-10-05 VITALS — HEART RATE: 78 BPM | OXYGEN SATURATION: 96 % | DIASTOLIC BLOOD PRESSURE: 68 MMHG | SYSTOLIC BLOOD PRESSURE: 121 MMHG

## 2022-10-05 DIAGNOSIS — C00.1 SQUAMOUS CELL CARCINOMA OF VERMILION BORDER OF LOWER LIP: ICD-10-CM

## 2022-10-05 DIAGNOSIS — C00.1: ICD-10-CM

## 2022-10-05 RX ORDER — LIDOCAINE HYDROCHLORIDE 20 MG/ML
SOLUTION OROPHARYNGEAL
Qty: 200 ML | Refills: 3 | Status: SHIPPED | OUTPATIENT
Start: 2022-10-05

## 2022-10-10 ENCOUNTER — HOSPITAL ENCOUNTER (OUTPATIENT)
Dept: RADIATION ONCOLOGY | Facility: MEDICAL CENTER | Age: 67
End: 2022-10-10

## 2022-10-10 LAB
CHEMOTHERAPY INFUSION START DATE: NORMAL
CHEMOTHERAPY RECORDS: 2
CHEMOTHERAPY RECORDS: 6600
CHEMOTHERAPY RECORDS: NORMAL
CHEMOTHERAPY RX CANCER: NORMAL
DATE 1ST CHEMO CANCER: NORMAL
RAD ONC ARIA COURSE LAST TREATMENT DATE: NORMAL
RAD ONC ARIA COURSE TREATMENT ELAPSED DAYS: NORMAL
RAD ONC ARIA REFERENCE POINT DOSAGE GIVEN TO DATE: 40
RAD ONC ARIA REFERENCE POINT DOSAGE GIVEN TO DATE: 46.49
RAD ONC ARIA REFERENCE POINT ID: NORMAL
RAD ONC ARIA REFERENCE POINT ID: NORMAL
RAD ONC ARIA REFERENCE POINT SESSION DOSAGE GIVEN: 2
RAD ONC ARIA REFERENCE POINT SESSION DOSAGE GIVEN: 2.32

## 2022-10-10 PROCEDURE — 77427 RADIATION TX MANAGEMENT X5: CPT | Performed by: RADIOLOGY

## 2022-10-10 PROCEDURE — 77412 RADIATION TX DELIVERY LVL 3: CPT | Performed by: RADIOLOGY

## 2022-10-11 ENCOUNTER — HOSPITAL ENCOUNTER (OUTPATIENT)
Dept: RADIATION ONCOLOGY | Facility: MEDICAL CENTER | Age: 67
End: 2022-10-11
Payer: MEDICARE

## 2022-10-11 LAB
CHEMOTHERAPY INFUSION START DATE: NORMAL
CHEMOTHERAPY RECORDS: 2
CHEMOTHERAPY RECORDS: 6600
CHEMOTHERAPY RECORDS: NORMAL
CHEMOTHERAPY RX CANCER: NORMAL
DATE 1ST CHEMO CANCER: NORMAL
RAD ONC ARIA COURSE LAST TREATMENT DATE: NORMAL
RAD ONC ARIA COURSE TREATMENT ELAPSED DAYS: NORMAL
RAD ONC ARIA REFERENCE POINT DOSAGE GIVEN TO DATE: 42
RAD ONC ARIA REFERENCE POINT DOSAGE GIVEN TO DATE: 48.82
RAD ONC ARIA REFERENCE POINT ID: NORMAL
RAD ONC ARIA REFERENCE POINT ID: NORMAL
RAD ONC ARIA REFERENCE POINT SESSION DOSAGE GIVEN: 2
RAD ONC ARIA REFERENCE POINT SESSION DOSAGE GIVEN: 2.32

## 2022-10-11 PROCEDURE — 77412 RADIATION TX DELIVERY LVL 3: CPT | Performed by: RADIOLOGY

## 2022-10-12 ENCOUNTER — HOSPITAL ENCOUNTER (OUTPATIENT)
Dept: RADIATION ONCOLOGY | Facility: MEDICAL CENTER | Age: 67
End: 2022-10-12
Payer: MEDICARE

## 2022-10-12 LAB
CHEMOTHERAPY INFUSION START DATE: NORMAL
CHEMOTHERAPY RECORDS: 2
CHEMOTHERAPY RECORDS: 6600
CHEMOTHERAPY RECORDS: NORMAL
CHEMOTHERAPY RX CANCER: NORMAL
DATE 1ST CHEMO CANCER: NORMAL
RAD ONC ARIA COURSE LAST TREATMENT DATE: NORMAL
RAD ONC ARIA COURSE TREATMENT ELAPSED DAYS: NORMAL
RAD ONC ARIA REFERENCE POINT DOSAGE GIVEN TO DATE: 44
RAD ONC ARIA REFERENCE POINT DOSAGE GIVEN TO DATE: 51.14
RAD ONC ARIA REFERENCE POINT ID: NORMAL
RAD ONC ARIA REFERENCE POINT ID: NORMAL
RAD ONC ARIA REFERENCE POINT SESSION DOSAGE GIVEN: 2
RAD ONC ARIA REFERENCE POINT SESSION DOSAGE GIVEN: 2.32

## 2022-10-12 PROCEDURE — 77412 RADIATION TX DELIVERY LVL 3: CPT | Performed by: RADIOLOGY

## 2022-10-12 NOTE — ON TREATMENT VISIT
ON TREATMENT  NOTE  RADIATION ONCOLOGY DEPARTMENT    Patient name:  Whitney Cotton    Primary Physician:  JIM Washington MRN: [id number]  CSN: [id number]   Referring physician:  No ref. provider found : 1955, 67 y.o.     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 on 2022  Stage prefix: Initial diagnosis      TREATMENT SUMMARY:  Aria Treatment Information          Some values may be hidden. Unless noted otherwise, only the newest values recorded on each date are displayed.           Aria Treatment Summary 10/12/22   Course First Treatment Date 2022   Course Last Treatment Date 10/12/2022   R_Lower_Lip Plan from Course C1_R_lowerlip   Fraction 22 of 33   Elapsed Course Days    Prescribed Fraction Dose 200 cGy   Prescribed Total Dose 6,600 cGy   R_Lower_Lip Reference Point from Course C1_R_lowerlip   Elapsed Course Days  @    Session Dose 200 cGy   Total Dose 4,400 cGy   R_Lower_Lip CP Reference Point from Course C1_R_lowerlip   Elapsed Course Days    Session Dose 232 cGy   Total Dose 5,114 cGy               SUBJECTIVE:  Sore lip but stable        VITAL SIGNS:  KPS: 90, Able to carry on normal activity; minor signs or symptoms of disease (ECOG equivalent 0)  Encounter Vitals  Blood Pressure : (!) (P) 152/52  Pulse: (P) 98  Pulse Oximetry: (P) 98 %  No flowsheet data found.       PHYSICAL EXAM:  Brisk mucositis but less swelling.No visible tumor      Toxicity Assessment 10/12/2022 10/5/2022 2022 2022 2022 2022   Toxicity Assessment Skin Skin Skin Skin Skin Skin   Fatigue (lethargy, malaise, asthenia) None None Increased fatigue over baseline, but not altering normal activities Moderate (e.g., decrease in performance status by  1 ECOG level or 20% Karnofsky) or causing difficulty performing some activities None None   Radiation Dermatitis Confluent moist desquamation 1.5 cm diameter or more and not confined to skin folds, with/without pitting edema Skin necrosis or ulceration of full thickness dermis and may include bleeding not induced by minor trauma or abrasion Moderate to brisk erythema or a patchy moist desquamation, mostly confined to skin folds and creases, with/without moderate edema Moderate to brisk erythema or a patchy moist desquamation, mostly confined to skin folds and creases, with/without moderate edema Faint erythema or dry desquamation None   Photosensitivity None None None None None None   Dry Skin Normal Not controlled with emollients Normal Normal Normal Normal   Alopecia Normal Normal Normal Normal Normal Normal   Erythema Multiforme (e.g., Wright-Miah syndrome, toxic epidermal necrolysis) Absent Absent Absent Absent Absent Absent   Nail Changes Normal Normal Discoloration or ridging (koilonychia) or pitting Normal Normal Normal   Wound (Non-Infectious) None Fascial disruption without evisceration Fascial disruption without evisceration None None None   Wound (Infectious) None None None None - None   RT - Pain due to RT Moderate pain, pain or analgesics interfering with function, but not interfering with activities of daily living Severe pain, pain or analgesics severely interfering with activities of daily living Moderate pain, pain or analgesics interfering with function, but not interfering with activities of daily living Moderate pain, pain or analgesics interfering with function, but not interfering with activities of daily living None None   Tumor Pain (onset or exacerbation of tumor pain due to treatment) Moderate pain, pain or analgesics interfering with function, but not interfering with activities of daily living Severe pain, pain or analgesics severely interfering with activities of daily living  Moderate pain, pain or analgesics interfering with function, but not interfering with activities of daily living Moderate pain, pain or analgesics interfering with function, but not interfering with activities of daily living None None   Skin - Late RT Marked atrophy, gross telangiectasia Ulceration Patchy atrophy, moderate telangiectasia, total hair loss Slight atrophy, pigmentation change, some hair loss No change from baseline No change from baseline           IMPRESSION:  Cancer Staging  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 on 8/2/2022      PLAN:  No change in treatment plan    Disposition:  Treatment plan reviewed. Questions answered. Continue therapy outlined.     Simi Feliciano M.D.    No orders of the defined types were placed in this encounter.

## 2022-10-13 ENCOUNTER — HOSPITAL ENCOUNTER (OUTPATIENT)
Dept: RADIATION ONCOLOGY | Facility: MEDICAL CENTER | Age: 67
End: 2022-10-13
Payer: MEDICARE

## 2022-10-13 LAB
CHEMOTHERAPY INFUSION START DATE: NORMAL
CHEMOTHERAPY RECORDS: 1600
CHEMOTHERAPY RECORDS: 2
CHEMOTHERAPY RECORDS: NORMAL
CHEMOTHERAPY RX CANCER: NORMAL
DATE 1ST CHEMO CANCER: NORMAL
RAD ONC ARIA COURSE LAST TREATMENT DATE: NORMAL
RAD ONC ARIA COURSE TREATMENT ELAPSED DAYS: NORMAL
RAD ONC ARIA REFERENCE POINT DOSAGE GIVEN TO DATE: 46
RAD ONC ARIA REFERENCE POINT DOSAGE GIVEN TO DATE: 53.47
RAD ONC ARIA REFERENCE POINT ID: NORMAL
RAD ONC ARIA REFERENCE POINT ID: NORMAL
RAD ONC ARIA REFERENCE POINT SESSION DOSAGE GIVEN: 2
RAD ONC ARIA REFERENCE POINT SESSION DOSAGE GIVEN: 2.32

## 2022-10-13 PROCEDURE — 77336 RADIATION PHYSICS CONSULT: CPT | Performed by: RADIOLOGY

## 2022-10-13 PROCEDURE — 77412 RADIATION TX DELIVERY LVL 3: CPT | Performed by: RADIOLOGY

## 2022-10-14 ENCOUNTER — HOSPITAL ENCOUNTER (OUTPATIENT)
Dept: RADIATION ONCOLOGY | Facility: MEDICAL CENTER | Age: 67
End: 2022-10-14
Payer: MEDICARE

## 2022-10-14 LAB
CHEMOTHERAPY INFUSION START DATE: NORMAL
CHEMOTHERAPY RECORDS: 1600
CHEMOTHERAPY RECORDS: 2
CHEMOTHERAPY RECORDS: NORMAL
CHEMOTHERAPY RX CANCER: NORMAL
DATE 1ST CHEMO CANCER: NORMAL
RAD ONC ARIA COURSE LAST TREATMENT DATE: NORMAL
RAD ONC ARIA COURSE TREATMENT ELAPSED DAYS: NORMAL
RAD ONC ARIA REFERENCE POINT DOSAGE GIVEN TO DATE: 48
RAD ONC ARIA REFERENCE POINT DOSAGE GIVEN TO DATE: 55.79
RAD ONC ARIA REFERENCE POINT ID: NORMAL
RAD ONC ARIA REFERENCE POINT ID: NORMAL
RAD ONC ARIA REFERENCE POINT SESSION DOSAGE GIVEN: 2
RAD ONC ARIA REFERENCE POINT SESSION DOSAGE GIVEN: 2.32

## 2022-10-14 PROCEDURE — 77412 RADIATION TX DELIVERY LVL 3: CPT | Performed by: RADIOLOGY

## 2022-10-17 ENCOUNTER — HOSPITAL ENCOUNTER (OUTPATIENT)
Dept: RADIATION ONCOLOGY | Facility: MEDICAL CENTER | Age: 67
End: 2022-10-17
Payer: MEDICARE

## 2022-10-17 LAB
CHEMOTHERAPY INFUSION START DATE: NORMAL
CHEMOTHERAPY RECORDS: 1600
CHEMOTHERAPY RECORDS: 2
CHEMOTHERAPY RECORDS: NORMAL
CHEMOTHERAPY RX CANCER: NORMAL
DATE 1ST CHEMO CANCER: NORMAL
RAD ONC ARIA COURSE LAST TREATMENT DATE: NORMAL
RAD ONC ARIA COURSE TREATMENT ELAPSED DAYS: NORMAL
RAD ONC ARIA REFERENCE POINT DOSAGE GIVEN TO DATE: 50
RAD ONC ARIA REFERENCE POINT DOSAGE GIVEN TO DATE: 58.12
RAD ONC ARIA REFERENCE POINT ID: NORMAL
RAD ONC ARIA REFERENCE POINT ID: NORMAL
RAD ONC ARIA REFERENCE POINT SESSION DOSAGE GIVEN: 2
RAD ONC ARIA REFERENCE POINT SESSION DOSAGE GIVEN: 2.32

## 2022-10-17 PROCEDURE — 77412 RADIATION TX DELIVERY LVL 3: CPT | Performed by: RADIOLOGY

## 2022-10-17 PROCEDURE — 77427 RADIATION TX MANAGEMENT X5: CPT | Performed by: RADIOLOGY

## 2022-10-18 ENCOUNTER — HOSPITAL ENCOUNTER (OUTPATIENT)
Dept: RADIATION ONCOLOGY | Facility: MEDICAL CENTER | Age: 67
End: 2022-10-18
Payer: MEDICARE

## 2022-10-18 LAB
CHEMOTHERAPY INFUSION START DATE: NORMAL
CHEMOTHERAPY RECORDS: 1600
CHEMOTHERAPY RECORDS: 2
CHEMOTHERAPY RECORDS: NORMAL
CHEMOTHERAPY RX CANCER: NORMAL
DATE 1ST CHEMO CANCER: NORMAL
RAD ONC ARIA COURSE LAST TREATMENT DATE: NORMAL
RAD ONC ARIA COURSE TREATMENT ELAPSED DAYS: NORMAL
RAD ONC ARIA REFERENCE POINT DOSAGE GIVEN TO DATE: 52
RAD ONC ARIA REFERENCE POINT DOSAGE GIVEN TO DATE: 60.44
RAD ONC ARIA REFERENCE POINT ID: NORMAL
RAD ONC ARIA REFERENCE POINT ID: NORMAL
RAD ONC ARIA REFERENCE POINT SESSION DOSAGE GIVEN: 2
RAD ONC ARIA REFERENCE POINT SESSION DOSAGE GIVEN: 2.32

## 2022-10-18 PROCEDURE — 77412 RADIATION TX DELIVERY LVL 3: CPT | Performed by: RADIOLOGY

## 2022-10-19 ENCOUNTER — HOSPITAL ENCOUNTER (OUTPATIENT)
Dept: RADIATION ONCOLOGY | Facility: MEDICAL CENTER | Age: 67
End: 2022-10-19
Payer: MEDICARE

## 2022-10-19 LAB
CHEMOTHERAPY INFUSION START DATE: NORMAL
CHEMOTHERAPY RECORDS: 1600
CHEMOTHERAPY RECORDS: 2
CHEMOTHERAPY RECORDS: NORMAL
CHEMOTHERAPY RX CANCER: NORMAL
DATE 1ST CHEMO CANCER: NORMAL
RAD ONC ARIA COURSE LAST TREATMENT DATE: NORMAL
RAD ONC ARIA COURSE TREATMENT ELAPSED DAYS: NORMAL
RAD ONC ARIA REFERENCE POINT DOSAGE GIVEN TO DATE: 54
RAD ONC ARIA REFERENCE POINT DOSAGE GIVEN TO DATE: 62.77
RAD ONC ARIA REFERENCE POINT ID: NORMAL
RAD ONC ARIA REFERENCE POINT ID: NORMAL
RAD ONC ARIA REFERENCE POINT SESSION DOSAGE GIVEN: 2
RAD ONC ARIA REFERENCE POINT SESSION DOSAGE GIVEN: 2.32

## 2022-10-19 PROCEDURE — 77412 RADIATION TX DELIVERY LVL 3: CPT | Performed by: RADIOLOGY

## 2022-10-19 NOTE — ON TREATMENT VISIT
ON TREATMENT  NOTE  RADIATION ONCOLOGY DEPARTMENT    Patient name:  Whitney Cotton    Primary Physician:  JIM Washington MRN: [id number]  CSN: [id number]   Referring physician:  No ref. provider found : 1955, 67 y.o.     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 on 2022  Stage prefix: Initial diagnosis      TREATMENT SUMMARY:  Aria Treatment Information          Some values may be hidden. Unless noted otherwise, only the newest values recorded on each date are displayed.           Aria Treatment Summary 10/19/22   Course First Treatment Date 2022   Course Last Treatment Date 10/19/2022   R_Lower_Lip Plan from Course C1_R_lowerlip   R_Lower_Lip:1 Plan from Course C1_R_lowerlip   Fraction 5 of 8   Elapsed Course Days 43 @    Prescribed Fraction Dose 200 cGy   Prescribed Total Dose 1,600 cGy   R_Lower_Lip Reference Point from Course C1_R_lowerlip   Elapsed Course Days 43 @    Session Dose 200 cGy   Total Dose 5,400 cGy   R_Lower_Lip CP Reference Point from Course C1_R_lowerlip   Elapsed Course Days 43 @    Session Dose 232 cGy   Total Dose 6,277 cGy               SUBJECTIVE:  Still with pain in the lip but otherwise in good spirits        VITAL SIGNS:  KPS: 100, Fully active, able to carry on all pre-disease performed without restriction (ECOG equivalent 0)     No flowsheet data found.       PHYSICAL EXAM:  Confluent mucositis swollen lip unchanged from last week      Toxicity Assessment 10/19/2022 10/12/2022 10/5/2022 2022 2022 2022 2022   Toxicity Assessment Skin Skin Skin Skin Skin Skin Skin   Fatigue (lethargy, malaise, asthenia) Increased fatigue over baseline, but not altering normal activities None None Increased fatigue over  baseline, but not altering normal activities Moderate (e.g., decrease in performance status by 1 ECOG level or 20% Karnofsky) or causing difficulty performing some activities None None   Radiation Dermatitis Confluent moist desquamation 1.5 cm diameter or more and not confined to skin folds, with/without pitting edema Confluent moist desquamation 1.5 cm diameter or more and not confined to skin folds, with/without pitting edema Skin necrosis or ulceration of full thickness dermis and may include bleeding not induced by minor trauma or abrasion Moderate to brisk erythema or a patchy moist desquamation, mostly confined to skin folds and creases, with/without moderate edema Moderate to brisk erythema or a patchy moist desquamation, mostly confined to skin folds and creases, with/without moderate edema Faint erythema or dry desquamation None   Photosensitivity None None None None None None None   Dry Skin Controlled with emollients Normal Not controlled with emollients Normal Normal Normal Normal   Alopecia Normal Normal Normal Normal Normal Normal Normal   Erythema Multiforme (e.g., Wright-Miah syndrome, toxic epidermal necrolysis) Absent Absent Absent Absent Absent Absent Absent   Nail Changes Normal Normal Normal Discoloration or ridging (koilonychia) or pitting Normal Normal Normal   Wound (Non-Infectious) None None Fascial disruption without evisceration Fascial disruption without evisceration None None None   Wound (Infectious) None None None None None - None   RT - Pain due to RT Moderate pain, pain or analgesics interfering with function, but not interfering with activities of daily living Moderate pain, pain or analgesics interfering with function, but not interfering with activities of daily living Severe pain, pain or analgesics severely interfering with activities of daily living Moderate pain, pain or analgesics interfering with function, but not interfering with activities of daily living Moderate  pain, pain or analgesics interfering with function, but not interfering with activities of daily living None None   Tumor Pain (onset or exacerbation of tumor pain due to treatment) Moderate pain, pain or analgesics interfering with function, but not interfering with activities of daily living Moderate pain, pain or analgesics interfering with function, but not interfering with activities of daily living Severe pain, pain or analgesics severely interfering with activities of daily living Moderate pain, pain or analgesics interfering with function, but not interfering with activities of daily living Moderate pain, pain or analgesics interfering with function, but not interfering with activities of daily living None None   Skin - Late RT Patchy atrophy, moderate telangiectasia, total hair loss Marked atrophy, gross telangiectasia Ulceration Patchy atrophy, moderate telangiectasia, total hair loss Slight atrophy, pigmentation change, some hair loss No change from baseline No change from baseline           IMPRESSION:  Cancer Staging  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 on 8/2/2022      PLAN:  No change in treatment plan    Disposition:  Treatment plan reviewed. Questions answered. Continue therapy outlined.     Simi Feliciano M.D.    No orders of the defined types were placed in this encounter.

## 2022-10-20 ENCOUNTER — HOSPITAL ENCOUNTER (OUTPATIENT)
Dept: RADIATION ONCOLOGY | Facility: MEDICAL CENTER | Age: 67
End: 2022-10-20
Payer: MEDICARE

## 2022-10-20 LAB
CHEMOTHERAPY INFUSION START DATE: NORMAL
CHEMOTHERAPY RECORDS: 1600
CHEMOTHERAPY RECORDS: 2
CHEMOTHERAPY RECORDS: NORMAL
CHEMOTHERAPY RX CANCER: NORMAL
DATE 1ST CHEMO CANCER: NORMAL
RAD ONC ARIA COURSE LAST TREATMENT DATE: NORMAL
RAD ONC ARIA COURSE TREATMENT ELAPSED DAYS: NORMAL
RAD ONC ARIA REFERENCE POINT DOSAGE GIVEN TO DATE: 56
RAD ONC ARIA REFERENCE POINT DOSAGE GIVEN TO DATE: 65.09
RAD ONC ARIA REFERENCE POINT ID: NORMAL
RAD ONC ARIA REFERENCE POINT ID: NORMAL
RAD ONC ARIA REFERENCE POINT SESSION DOSAGE GIVEN: 2
RAD ONC ARIA REFERENCE POINT SESSION DOSAGE GIVEN: 2.32

## 2022-10-20 PROCEDURE — 77412 RADIATION TX DELIVERY LVL 3: CPT | Performed by: RADIOLOGY

## 2022-10-20 PROCEDURE — 77336 RADIATION PHYSICS CONSULT: CPT | Performed by: RADIOLOGY

## 2022-10-21 ENCOUNTER — HOSPITAL ENCOUNTER (OUTPATIENT)
Dept: RADIATION ONCOLOGY | Facility: MEDICAL CENTER | Age: 67
End: 2022-10-21
Payer: MEDICARE

## 2022-10-21 DIAGNOSIS — C44.02 SQUAMOUS CELL CANCER OF LIP: ICD-10-CM

## 2022-10-21 LAB
CHEMOTHERAPY INFUSION START DATE: NORMAL
CHEMOTHERAPY RECORDS: 1600
CHEMOTHERAPY RECORDS: 2
CHEMOTHERAPY RECORDS: NORMAL
CHEMOTHERAPY RX CANCER: NORMAL
DATE 1ST CHEMO CANCER: NORMAL
RAD ONC ARIA COURSE LAST TREATMENT DATE: NORMAL
RAD ONC ARIA COURSE TREATMENT ELAPSED DAYS: NORMAL
RAD ONC ARIA REFERENCE POINT DOSAGE GIVEN TO DATE: 58
RAD ONC ARIA REFERENCE POINT DOSAGE GIVEN TO DATE: 67.42
RAD ONC ARIA REFERENCE POINT ID: NORMAL
RAD ONC ARIA REFERENCE POINT ID: NORMAL
RAD ONC ARIA REFERENCE POINT SESSION DOSAGE GIVEN: 2
RAD ONC ARIA REFERENCE POINT SESSION DOSAGE GIVEN: 2.32

## 2022-10-21 PROCEDURE — 77412 RADIATION TX DELIVERY LVL 3: CPT | Performed by: RADIOLOGY

## 2022-10-21 RX ORDER — HYDROCODONE BITARTRATE AND ACETAMINOPHEN 5; 325 MG/1; MG/1
1 TABLET ORAL EVERY 4 HOURS PRN
Qty: 20 TABLET | Refills: 0 | Status: SHIPPED | OUTPATIENT
Start: 2022-10-21 | End: 2022-10-28 | Stop reason: SDUPTHER

## 2022-10-24 ENCOUNTER — HOSPITAL ENCOUNTER (OUTPATIENT)
Dept: RADIATION ONCOLOGY | Facility: MEDICAL CENTER | Age: 67
End: 2022-10-24

## 2022-10-24 LAB
CHEMOTHERAPY INFUSION START DATE: NORMAL
CHEMOTHERAPY INFUSION START DATE: NORMAL
CHEMOTHERAPY INFUSION STOP DATE: NORMAL
CHEMOTHERAPY RECORDS: 1600
CHEMOTHERAPY RECORDS: 1600
CHEMOTHERAPY RECORDS: 2
CHEMOTHERAPY RECORDS: 6600
CHEMOTHERAPY RECORDS: NORMAL
CHEMOTHERAPY RX CANCER: NORMAL
CHEMOTHERAPY RX CANCER: NORMAL
DATE 1ST CHEMO CANCER: NORMAL
DATE 1ST CHEMO CANCER: NORMAL
RAD ONC ARIA COURSE LAST TREATMENT DATE: NORMAL
RAD ONC ARIA COURSE LAST TREATMENT DATE: NORMAL
RAD ONC ARIA COURSE TREATMENT ELAPSED DAYS: NORMAL
RAD ONC ARIA COURSE TREATMENT ELAPSED DAYS: NORMAL
RAD ONC ARIA REFERENCE POINT DOSAGE GIVEN TO DATE: 60
RAD ONC ARIA REFERENCE POINT DOSAGE GIVEN TO DATE: 60
RAD ONC ARIA REFERENCE POINT DOSAGE GIVEN TO DATE: 69.74
RAD ONC ARIA REFERENCE POINT DOSAGE GIVEN TO DATE: 69.74
RAD ONC ARIA REFERENCE POINT ID: NORMAL
RAD ONC ARIA REFERENCE POINT SESSION DOSAGE GIVEN: 2
RAD ONC ARIA REFERENCE POINT SESSION DOSAGE GIVEN: 2.32

## 2022-10-24 PROCEDURE — 77412 RADIATION TX DELIVERY LVL 3: CPT | Performed by: RADIOLOGY

## 2022-10-24 PROCEDURE — 77427 RADIATION TX MANAGEMENT X5: CPT | Performed by: RADIOLOGY

## 2022-10-24 NOTE — RADIATION COMPLETION NOTES
END OF TREATMENT SUMMARY    Patient name:  Whitney Cotton    Primary Physician:  JIM Washington MRN: [id number]  CSN: [id number]   Referring physician:   Dr. Dillon : 1955, 67 y.o.       TREATMENT SUMMARY:        Course First Treatment Date 2022    Course Last Treatment Date 10/24/2022   Course Elapsed Days 48 @ [id number]   Course Intent Curative     Radiation Therapy Episodes       Active Episodes       Radiation Therapy: Electron Beam    Radiation Therapy: Electron Beam                   Radiation Treatments         Plan Last Treated On Elapsed Days Fractions Treated Prescribed Fraction Dose (cGy) Prescribed Total Dose (cGy)    R_Lower_Lip 10/12/2022 36 @ [id number] 22 of 33 200 6,600    R_Lower_Lip:1 10/24/2022 48 @ [id number] 8 of 8 200 1,600                  Reference Point Last Treated On Elapsed Days Most Recent Session Dose (cGy) Total Dose (cGy)    R_Lower_Lip 10/24/2022 48 @ [id number] 200 6,000    R_Lower_Lip CP 10/24/2022 48 @ [id number] 232 6,974                                     STAGE:   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 on 2022  Stage prefix: Initial diagnosis       TREATMENT INDICATION:   Local control, cure     CONCURRENT SYSTEMIC TREATMENT:   None     RT COURSE DISCONTINUED EARLY:   No     PATIENT EXPERIENCE:   Toxicity Assessment 10/19/2022 10/12/2022 10/5/2022 2022 2022 2022 2022   Toxicity Assessment Skin Skin Skin Skin Skin Skin Skin   Fatigue (lethargy, malaise, asthenia) Increased fatigue over baseline, but not altering normal activities None None Increased fatigue over baseline, but not altering normal activities Moderate (e.g., decrease in performance status by 1 ECOG level or 20% Karnofsky) or causing difficulty performing some activities None None   Radiation  Dermatitis Confluent moist desquamation 1.5 cm diameter or more and not confined to skin folds, with/without pitting edema Confluent moist desquamation 1.5 cm diameter or more and not confined to skin folds, with/without pitting edema Skin necrosis or ulceration of full thickness dermis and may include bleeding not induced by minor trauma or abrasion Moderate to brisk erythema or a patchy moist desquamation, mostly confined to skin folds and creases, with/without moderate edema Moderate to brisk erythema or a patchy moist desquamation, mostly confined to skin folds and creases, with/without moderate edema Faint erythema or dry desquamation None   Photosensitivity None None None None None None None   Dry Skin Controlled with emollients Normal Not controlled with emollients Normal Normal Normal Normal   Alopecia Normal Normal Normal Normal Normal Normal Normal   Erythema Multiforme (e.g., Wright-Miah syndrome, toxic epidermal necrolysis) Absent Absent Absent Absent Absent Absent Absent   Nail Changes Normal Normal Normal Discoloration or ridging (koilonychia) or pitting Normal Normal Normal   Wound (Non-Infectious) None None Fascial disruption without evisceration Fascial disruption without evisceration None None None   Wound (Infectious) None None None None None - None   RT - Pain due to RT Moderate pain, pain or analgesics interfering with function, but not interfering with activities of daily living Moderate pain, pain or analgesics interfering with function, but not interfering with activities of daily living Severe pain, pain or analgesics severely interfering with activities of daily living Moderate pain, pain or analgesics interfering with function, but not interfering with activities of daily living Moderate pain, pain or analgesics interfering with function, but not interfering with activities of daily living None None   Tumor Pain (onset or exacerbation of tumor pain due to treatment) Moderate pain, pain  or analgesics interfering with function, but not interfering with activities of daily living Moderate pain, pain or analgesics interfering with function, but not interfering with activities of daily living Severe pain, pain or analgesics severely interfering with activities of daily living Moderate pain, pain or analgesics interfering with function, but not interfering with activities of daily living Moderate pain, pain or analgesics interfering with function, but not interfering with activities of daily living None None   Skin - Late RT Patchy atrophy, moderate telangiectasia, total hair loss Marked atrophy, gross telangiectasia Ulceration Patchy atrophy, moderate telangiectasia, total hair loss Slight atrophy, pigmentation change, some hair loss No change from baseline No change from baseline        FOLLOW-UP PLAN:   4 weeks or sooner depending on skin reaction     COMMENT:          ANATOMIC TARGET SUMMARY    ANATOMIC TARGET MODALITY TECHNIQUE   Lower lip   External beam, electrons En face             COMMENT:         DIAGRAMS:      DOSE VOLUME HISTOGRAMS:

## 2022-10-28 DIAGNOSIS — C44.02 SQUAMOUS CELL CANCER OF LIP: ICD-10-CM

## 2022-10-28 RX ORDER — HYDROCODONE BITARTRATE AND ACETAMINOPHEN 5; 325 MG/1; MG/1
1 TABLET ORAL EVERY 4 HOURS PRN
Qty: 42 TABLET | Refills: 0 | Status: SHIPPED | OUTPATIENT
Start: 2022-10-28 | End: 2022-11-04

## 2022-10-31 ENCOUNTER — TELEPHONE (OUTPATIENT)
Dept: NUTRITION | Facility: MEDICAL CENTER | Age: 67
End: 2022-10-31
Payer: MEDICARE

## 2022-10-31 NOTE — TELEPHONE ENCOUNTER
[facility]: Telephone Encounter     RD received voicemail from 10/28 today from pt. RD called pt back regarding her voicemail but pt did not answer the phone. LVM with RD contact information.       RD available PRN  Please contact -[phone]

## 2022-11-29 ENCOUNTER — HOSPITAL ENCOUNTER (OUTPATIENT)
Dept: RADIATION ONCOLOGY | Facility: MEDICAL CENTER | Age: 67
End: 2022-11-30
Attending: RADIOLOGY
Payer: MEDICARE

## 2022-11-29 VITALS
OXYGEN SATURATION: 96 % | SYSTOLIC BLOOD PRESSURE: 118 MMHG | HEART RATE: 102 BPM | WEIGHT: 136.69 LBS | DIASTOLIC BLOOD PRESSURE: 64 MMHG

## 2022-11-29 DIAGNOSIS — J01.00 ACUTE NON-RECURRENT MAXILLARY SINUSITIS: ICD-10-CM

## 2022-11-29 DIAGNOSIS — C00.1 SQUAMOUS CELL CARCINOMA OF VERMILION BORDER OF LOWER LIP: ICD-10-CM

## 2022-11-29 PROCEDURE — 99212 OFFICE O/P EST SF 10 MIN: CPT | Performed by: RADIOLOGY

## 2022-11-29 RX ORDER — AZITHROMYCIN 250 MG/1
250 TABLET, FILM COATED ORAL DAILY
Qty: 1 TABLET | Refills: 0 | Status: SHIPPED | OUTPATIENT
Start: 2022-11-29

## 2022-11-29 NOTE — PROGRESS NOTES
RADIATION ONCOLOGY FOLLOW-UP    DATE OF SERVICE: 11/29/2022    IDENTIFICATION:   A 67 y.o. 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  She is here at the kind request of Dr. Olu Dillon.  . Radiation therapy was completed on 10/24/22.    HISTORY OF PRESENT ILLNESS:   Here for routine follow-up.  Since last seen she lost a lot of weight because she was unable to eat because of the sore lip.  It is since healed but she still has quite a bit of sensitivity.  It makes her hard to continue with dental hygiene because it is hard to get in there and clean.  Also hard for her to see a dentist because of the stretching of her lip from the prior surgery and now the radiation.Her biggest complaint now is that she is got a little bit of a sinus infection and she has extremely poor dentition due to the above-mentioned reasons.  It should be noted that the radiation was only electron therapy so it did not reach the teeth    CURRENT MEDICATIONS:  Current Outpatient Medications   Medication Sig Dispense Refill    azithromycin (ZITHROMAX Z-RACHELE) 250 MG Tab Take 1 Tablet by mouth every day. 2 tabs by mouth day 1, 1 tab by mouth days 2-5 1 Tablet 0    lidocaine (XYLOCAINE) 2 % Solution Mix with equal part Maalox. 1 Tbsp q 4 hours prn mouth pain. 200 mL 3    silver sulfADIAZINE (SILVADENE) 1 % Cream Apply 1 g topically every day. 400 g 3    ibuprofen (MOTRIN) 800 MG Tab Take 1 Tablet by mouth every 8 hours as needed for Moderate Pain. 30 Tablet 3    cyclobenzaprine (FLEXERIL) 5 MG tablet Take 1 Tab by mouth 2 times a day as needed for Mild Pain. 60 Tab 3    ibuprofen (MOTRIN) 800 MG TABS Take 1 Tab by mouth 2 times a day as needed for Mild Pain. 60 Each 1    ranitidine (ZANTAC) 150 MG TABS Take 1 Tab by mouth 2 times a day. 60 Each 11    amitriptyline (ELAVIL) 25 MG TABS Take 1 Tab by mouth every evening. FOR PAIN 30 Each 3    cyclobenzaprine (FLEXERIL) 5 MG tablet Take 1 Tab by mouth 3  times a day as needed for Mild Pain. 30 Tab 0    AMITRIPTYLINE HCL 50 MG PO TABS Take 50 mg by mouth every evening.       No current facility-administered medications for this encounter.       ALLERGIES:  Penicillins, Asa [aspirin], Codeine, Tramadol, and Valium    FAMILY HISTORY:    History reviewed. No pertinent family history.[unfilled]        SOCIAL HISTORY:     reports that she has been smoking cigarettes. She started smoking about 42 years ago. She has a 5.00 pack-year smoking history. She has never used smokeless tobacco. She reports that she does not currently use alcohol. She reports current drug use. Drug: Marijuana.    PAIN: As described in HPI    REVIEW OF SYSTEMS: Is significant for Weight loss as described above.  She has a decrease in appetite and fatigue mild fever because she just getting over an infection she has some congestion in her sinuses bilaterally.  She has an alteration in taste dental issues as described above.  She has joint pains joint swelling and shortness of breath all of which are chronic.  The rest of the review of systems has been reviewed.    PHYSICAL EXAM:     ECOG PERFORMANCE STATUS:  0= Fully active, able to carry on all pre-disease performance without restriction.   Vitals:    11/29/22 0933   BP: 118/64   BP Location: Right arm   Patient Position: Sitting   Pulse: (!) 102   SpO2: 96%   Weight: 62 kg (136 lb 11 oz)            GENERAL: Well-appearing alert and oriented x3 no apparent distress.  HEENT:  Pupils are equal, round, and reactive to light.  Extraocular muscles   are intact. Sclerae nonicteric.  Conjunctivae pink.  Oral cavity, tongue   protrudes midline. Lower lip is erythematous and tender but there is no visible tumor.  It is slightly swollen consistent with radiation change.  NECK:  No preauricular neck supraclavicular axillary adenopathy.  LUNGS:  Clear to ascultation   HEART:  Regular rate and rhythm.  No murmur appreciated  ABDOMEN:  Soft. No evidence of  hepatosplenomegaly.   EXTREMITIES:  Without Edema.  NEUROLOGIC:  Cranial nerves II through XII were intact. Normal stance and gait motor and sensory grossly within normal limits          IMPRESSION:    A 67 y.o. 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  She is here at the kind request of Dr. Olu Dillon.  . Radiation therapy was completed on 10/24/22. No evidence of disease.    RECOMMENDATIONS:     I would like to see the patient back in 6 months.  Because of her poor dentition and making a dental referral because it is somewhat related to cancer because she is unable to open her mouth due to pain from the original tumor.I I also ordered a Z-Alex.        Thank you for the opportunity to participate in her care.  If any questions or comments, please do not hesitate in calling.      Please note that this dictation was created using voice recognition software. I have made every reasonable attempt to correct obvious errors, but I expect that there are errors of grammar and possibly content that I did not discover before finalizing the note.

## 2022-11-29 NOTE — PROGRESS NOTES
Patient was seen today in clinic with Dr. Feliciano for follow up.  Vitals signs and weight were obtained and pain assessment was completed.  Allergies and medications were reviewed with the patient.  Toxicities of treatment assessed.     Vitals/Pain:  Vitals:    11/29/22 0933   BP: 118/64   BP Location: Right arm   Patient Position: Sitting   Pulse: (!) 102   SpO2: 96%   Weight: 62 kg (136 lb 11 oz)            Allergies:   Penicillins, Asa [aspirin], Codeine, Tramadol, and Valium    Current Medications:  Current Outpatient Medications   Medication Sig Dispense Refill    azithromycin (ZITHROMAX Z-RACHELE) 250 MG Tab Take 1 Tablet by mouth every day. 2 tabs by mouth day 1, 1 tab by mouth days 2-5 1 Tablet 0    lidocaine (XYLOCAINE) 2 % Solution Mix with equal part Maalox. 1 Tbsp q 4 hours prn mouth pain. 200 mL 3    silver sulfADIAZINE (SILVADENE) 1 % Cream Apply 1 g topically every day. 400 g 3    ibuprofen (MOTRIN) 800 MG Tab Take 1 Tablet by mouth every 8 hours as needed for Moderate Pain. 30 Tablet 3    cyclobenzaprine (FLEXERIL) 5 MG tablet Take 1 Tab by mouth 2 times a day as needed for Mild Pain. 60 Tab 3    ibuprofen (MOTRIN) 800 MG TABS Take 1 Tab by mouth 2 times a day as needed for Mild Pain. 60 Each 1    ranitidine (ZANTAC) 150 MG TABS Take 1 Tab by mouth 2 times a day. 60 Each 11    amitriptyline (ELAVIL) 25 MG TABS Take 1 Tab by mouth every evening. FOR PAIN 30 Each 3    cyclobenzaprine (FLEXERIL) 5 MG tablet Take 1 Tab by mouth 3 times a day as needed for Mild Pain. 30 Tab 0    AMITRIPTYLINE HCL 50 MG PO TABS Take 50 mg by mouth every evening.       No current facility-administered medications for this encounter.           Efrain Romero Ass't

## 2023-05-02 ENCOUNTER — APPOINTMENT (OUTPATIENT)
Dept: MEDICAL GROUP | Facility: MEDICAL CENTER | Age: 68
End: 2023-05-02
Payer: MEDICARE

## 2023-05-08 ENCOUNTER — HOSPITAL ENCOUNTER (OUTPATIENT)
Dept: RADIATION ONCOLOGY | Facility: MEDICAL CENTER | Age: 68
End: 2023-05-31
Attending: RADIOLOGY
Payer: MEDICARE

## 2023-05-08 VITALS
OXYGEN SATURATION: 99 % | WEIGHT: 139.99 LBS | DIASTOLIC BLOOD PRESSURE: 75 MMHG | HEART RATE: 87 BPM | SYSTOLIC BLOOD PRESSURE: 139 MMHG

## 2023-05-08 PROCEDURE — 99212 OFFICE O/P EST SF 10 MIN: CPT | Performed by: RADIOLOGY

## 2023-05-08 PROCEDURE — 99213 OFFICE O/P EST LOW 20 MIN: CPT | Performed by: RADIOLOGY

## 2023-05-08 ASSESSMENT — PAIN SCALES - GENERAL: PAINLEVEL: NO PAIN

## 2023-05-08 NOTE — PROGRESS NOTES
Patient was seen today in clinic with Dr. Feliciano for follow up.  Vitals signs and weight were obtained and pain assessment was completed.  Allergies and medications were reviewed with the patient.  Toxicities of treatment assessed.     Vitals/Pain:  Vitals:    05/08/23 1106   BP: 139/75   BP Location: Right arm   Patient Position: Sitting   BP Cuff Size: Adult   Pulse: 87   SpO2: 99%   Weight: 63.5 kg (139 lb 15.9 oz)   Pain Score: No pain        Allergies:   Penicillins, Asa [aspirin], Codeine, Tramadol, and Valium    Current Medications:  Current Outpatient Medications   Medication Sig Dispense Refill    azithromycin (ZITHROMAX Z-RACHELE) 250 MG Tab Take 1 Tablet by mouth every day. 2 tabs by mouth day 1, 1 tab by mouth days 2-5 1 Tablet 0    lidocaine (XYLOCAINE) 2 % Solution Mix with equal part Maalox. 1 Tbsp q 4 hours prn mouth pain. 200 mL 3    silver sulfADIAZINE (SILVADENE) 1 % Cream Apply 1 g topically every day. 400 g 3    ibuprofen (MOTRIN) 800 MG Tab Take 1 Tablet by mouth every 8 hours as needed for Moderate Pain. 30 Tablet 3    cyclobenzaprine (FLEXERIL) 5 MG tablet Take 1 Tab by mouth 2 times a day as needed for Mild Pain. 60 Tab 3    ibuprofen (MOTRIN) 800 MG TABS Take 1 Tab by mouth 2 times a day as needed for Mild Pain. 60 Each 1    ranitidine (ZANTAC) 150 MG TABS Take 1 Tab by mouth 2 times a day. 60 Each 11    amitriptyline (ELAVIL) 25 MG TABS Take 1 Tab by mouth every evening. FOR PAIN 30 Each 3    cyclobenzaprine (FLEXERIL) 5 MG tablet Take 1 Tab by mouth 3 times a day as needed for Mild Pain. 30 Tab 0    AMITRIPTYLINE HCL 50 MG PO TABS Take 50 mg by mouth every evening.       No current facility-administered medications for this encounter.         PCP:  No primary care provider on file.        Efrain Garcia Ass't

## 2023-05-08 NOTE — PROGRESS NOTES
RADIATION ONCOLOGY FOLLOW-UP    DATE OF SERVICE: 5/8/2023    IDENTIFICATION:   A 67 y.o. 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  She is here at the kind request of Dr. Olu Dillon. Radiation therapy was completed on 10/24/22.     HISTORY OF PRESENT ILLNESS:     Since last seen patient is very happy with the cosmetic result of the radiation.  She has been concerned about the stinging that she has had since this was scraped for diagnosis.  She is going to be switching dermatologist and have them evaluate this.    CURRENT MEDICATIONS:  Current Outpatient Medications   Medication Sig Dispense Refill    azithromycin (ZITHROMAX Z-RACHELE) 250 MG Tab Take 1 Tablet by mouth every day. 2 tabs by mouth day 1, 1 tab by mouth days 2-5 1 Tablet 0    lidocaine (XYLOCAINE) 2 % Solution Mix with equal part Maalox. 1 Tbsp q 4 hours prn mouth pain. 200 mL 3    silver sulfADIAZINE (SILVADENE) 1 % Cream Apply 1 g topically every day. 400 g 3    ibuprofen (MOTRIN) 800 MG Tab Take 1 Tablet by mouth every 8 hours as needed for Moderate Pain. 30 Tablet 3    cyclobenzaprine (FLEXERIL) 5 MG tablet Take 1 Tab by mouth 2 times a day as needed for Mild Pain. 60 Tab 3    ibuprofen (MOTRIN) 800 MG TABS Take 1 Tab by mouth 2 times a day as needed for Mild Pain. 60 Each 1    ranitidine (ZANTAC) 150 MG TABS Take 1 Tab by mouth 2 times a day. 60 Each 11    amitriptyline (ELAVIL) 25 MG TABS Take 1 Tab by mouth every evening. FOR PAIN 30 Each 3    cyclobenzaprine (FLEXERIL) 5 MG tablet Take 1 Tab by mouth 3 times a day as needed for Mild Pain. 30 Tab 0    AMITRIPTYLINE HCL 50 MG PO TABS Take 50 mg by mouth every evening.       No current facility-administered medications for this encounter.       ALLERGIES:  Penicillins, Asa [aspirin], Codeine, Tramadol, and Valium    FAMILY HISTORY:    No family history on file.[unfilled]        SOCIAL HISTORY:     reports that she has been smoking cigarettes. She started  smoking about 43 years ago. She has a 5.00 pack-year smoking history. She has never used smokeless tobacco. She reports that she does not currently use alcohol. She reports current drug use. Drug: Marijuana.    PAIN: 0    REVIEW OF SYSTEMS: Is significant for   Alteration in taste dental issues for which she has been looking for a dentist for years but she is working on it.  She does have fatigue a prior history of seizure all of which are chronic.  The rest of the review of systems has been reviewed.    PHYSICAL EXAM:     ECOG PERFORMANCE STATUS:  0= Fully active, able to carry on all pre-disease performance without restriction.   Vitals:    05/08/23 1106   BP: 139/75   BP Location: Right arm   Patient Position: Sitting   BP Cuff Size: Adult   Pulse: 87   SpO2: 99%   Weight: 63.5 kg (139 lb 15.9 oz)   Pain Score: No pain        GENERAL:   Well-appearing alert and oriented x3 in no apparent distress   lower lip: Evidence of the prior radiation but no sign of scab formation.  It is smooth and flat she does have some scarring but that is also consistent with the prior surgeries and some of the skin changes she has from the sun.  I do not appreciate any tumor recurrence.  HEENT:  Pupils are equal, round, and reactive to light.  Extraocular muscles   are intact. Sclerae nonicteric.  Conjunctivae pink.  Oral cavity, tongue   protrudes midline.   NECK:  No preauricular neck supraclavicular axillary adenopathy.  LUNGS:  Clear to ascultation   HEART:  Regular rate and rhythm.  No murmur appreciated  ABDOMEN:  Soft. No evidence of hepatosplenomegaly.   EXTREMITIES:  Without Edema.  NEUROLOGIC:  Cranial nerves II through XII were intact. Normal stance and gait motor and sensory grossly within normal limits          IMPRESSION:    A 67 y.o. 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 Radiation therapy was completed on 10/24/22.    RECOMMENDATIONS:       I will see patient back in 6 months  or sooner as needed.        Thank you for the opportunity to participate in her care.  If any questions or comments, please do not hesitate in calling.      Please note that this dictation was created using voice recognition software. I have made every reasonable attempt to correct obvious errors, but I expect that there are errors of grammar and possibly content that I did not discover before finalizing the note.

## 2023-05-09 ENCOUNTER — APPOINTMENT (RX ONLY)
Dept: URBAN - METROPOLITAN AREA CLINIC 6 | Facility: CLINIC | Age: 68
Setting detail: DERMATOLOGY
End: 2023-05-09

## 2023-05-09 DIAGNOSIS — D18.0 HEMANGIOMA: ICD-10-CM

## 2023-05-09 DIAGNOSIS — L84 CORNS AND CALLOSITIES: ICD-10-CM

## 2023-05-09 DIAGNOSIS — Z71.89 OTHER SPECIFIED COUNSELING: ICD-10-CM

## 2023-05-09 DIAGNOSIS — L82.1 OTHER SEBORRHEIC KERATOSIS: ICD-10-CM

## 2023-05-09 DIAGNOSIS — L81.4 OTHER MELANIN HYPERPIGMENTATION: ICD-10-CM

## 2023-05-09 DIAGNOSIS — L90.5 SCAR CONDITIONS AND FIBROSIS OF SKIN: ICD-10-CM

## 2023-05-09 DIAGNOSIS — D22 MELANOCYTIC NEVI: ICD-10-CM

## 2023-05-09 PROBLEM — D22.72 MELANOCYTIC NEVI OF LEFT LOWER LIMB, INCLUDING HIP: Status: ACTIVE | Noted: 2023-05-09

## 2023-05-09 PROBLEM — D22.62 MELANOCYTIC NEVI OF LEFT UPPER LIMB, INCLUDING SHOULDER: Status: ACTIVE | Noted: 2023-05-09

## 2023-05-09 PROBLEM — D22.61 MELANOCYTIC NEVI OF RIGHT UPPER LIMB, INCLUDING SHOULDER: Status: ACTIVE | Noted: 2023-05-09

## 2023-05-09 PROBLEM — D18.01 HEMANGIOMA OF SKIN AND SUBCUTANEOUS TISSUE: Status: ACTIVE | Noted: 2023-05-09

## 2023-05-09 PROBLEM — D22.5 MELANOCYTIC NEVI OF TRUNK: Status: ACTIVE | Noted: 2023-05-09

## 2023-05-09 PROBLEM — D22.71 MELANOCYTIC NEVI OF RIGHT LOWER LIMB, INCLUDING HIP: Status: ACTIVE | Noted: 2023-05-09

## 2023-05-09 PROCEDURE — ? ADDITIONAL NOTES

## 2023-05-09 PROCEDURE — 99213 OFFICE O/P EST LOW 20 MIN: CPT

## 2023-05-09 PROCEDURE — ? COUNSELING

## 2023-05-09 ASSESSMENT — LOCATION DETAILED DESCRIPTION DERM
LOCATION DETAILED: RIGHT VENTRAL PROXIMAL FOREARM
LOCATION DETAILED: LEFT DISTAL POSTERIOR THIGH
LOCATION DETAILED: LEFT CENTRAL MALAR CHEEK
LOCATION DETAILED: LEFT ANTERIOR DISTAL UPPER ARM
LOCATION DETAILED: RIGHT VENTRAL DISTAL FOREARM
LOCATION DETAILED: LEFT LATERAL PLANTAR MIDFOOT
LOCATION DETAILED: RIGHT MEDIAL INFERIOR CHEST
LOCATION DETAILED: LEFT ANTERIOR PROXIMAL THIGH
LOCATION DETAILED: RIGHT ANTERIOR PROXIMAL THIGH
LOCATION DETAILED: LEFT VENTRAL DISTAL FOREARM
LOCATION DETAILED: LEFT INFERIOR UPPER BACK
LOCATION DETAILED: RIGHT DISTAL POSTERIOR THIGH
LOCATION DETAILED: RIGHT MID-UPPER BACK
LOCATION DETAILED: RIGHT PROXIMAL DORSAL FOREARM
LOCATION DETAILED: RIGHT LOWER CUTANEOUS LIP
LOCATION DETAILED: RIGHT PROXIMAL CALF
LOCATION DETAILED: RIGHT SUPERIOR LATERAL MIDBACK
LOCATION DETAILED: LEFT PROXIMAL DORSAL FOREARM
LOCATION DETAILED: LEFT PROXIMAL CALF
LOCATION DETAILED: RIGHT ANTERIOR DISTAL UPPER ARM

## 2023-05-09 ASSESSMENT — LOCATION SIMPLE DESCRIPTION DERM
LOCATION SIMPLE: RIGHT LIP
LOCATION SIMPLE: RIGHT POSTERIOR THIGH
LOCATION SIMPLE: LEFT POSTERIOR THIGH
LOCATION SIMPLE: CHEST
LOCATION SIMPLE: RIGHT CALF
LOCATION SIMPLE: RIGHT THIGH
LOCATION SIMPLE: LEFT UPPER ARM
LOCATION SIMPLE: RIGHT LOWER BACK
LOCATION SIMPLE: LEFT CALF
LOCATION SIMPLE: LEFT FOREARM
LOCATION SIMPLE: LEFT PLANTAR SURFACE
LOCATION SIMPLE: LEFT THIGH
LOCATION SIMPLE: RIGHT FOREARM
LOCATION SIMPLE: RIGHT UPPER ARM
LOCATION SIMPLE: LEFT UPPER BACK
LOCATION SIMPLE: RIGHT UPPER BACK
LOCATION SIMPLE: LEFT CHEEK

## 2023-05-09 ASSESSMENT — LOCATION ZONE DERM
LOCATION ZONE: FACE
LOCATION ZONE: FEET
LOCATION ZONE: LIP
LOCATION ZONE: LEG
LOCATION ZONE: ARM
LOCATION ZONE: TRUNK

## 2023-05-09 NOTE — PROCEDURE: ADDITIONAL NOTES
Detail Level: Simple
Render Risk Assessment In Note?: yes
Additional Notes: Pt had 4 months of radiation, finished October 2022 with Dr. Arredondo. Currently has 2 small bumps on the area, could be scar but discussed possibility of recurrent SCC. Recommended bx- pt wants to monitor. Will recheck in 4 months
Additional Notes: Recommended corn pads otc\\nPaired with curette

## 2023-06-14 ENCOUNTER — APPOINTMENT (OUTPATIENT)
Dept: MEDICAL GROUP | Facility: CLINIC | Age: 68
End: 2023-06-14
Payer: MEDICARE

## 2023-08-03 ENCOUNTER — APPOINTMENT (OUTPATIENT)
Dept: MEDICAL GROUP | Facility: CLINIC | Age: 68
End: 2023-08-03
Payer: MEDICARE

## 2023-11-06 ENCOUNTER — HOSPITAL ENCOUNTER (OUTPATIENT)
Dept: RADIATION ONCOLOGY | Facility: MEDICAL CENTER | Age: 68
End: 2023-11-30
Attending: RADIOLOGY
Payer: MEDICARE

## 2023-11-06 VITALS
OXYGEN SATURATION: 100 % | WEIGHT: 141.76 LBS | SYSTOLIC BLOOD PRESSURE: 124 MMHG | DIASTOLIC BLOOD PRESSURE: 72 MMHG | HEART RATE: 97 BPM

## 2023-11-06 PROCEDURE — 99213 OFFICE O/P EST LOW 20 MIN: CPT | Performed by: RADIOLOGY

## 2023-11-06 PROCEDURE — 99212 OFFICE O/P EST SF 10 MIN: CPT | Performed by: RADIOLOGY

## 2023-11-06 ASSESSMENT — PAIN SCALES - GENERAL: PAINLEVEL: 5=MODERATE PAIN

## 2023-11-06 NOTE — PROGRESS NOTES
RADIATION ONCOLOGY FOLLOW-UP    DATE OF SERVICE: 11/6/2023    IDENTIFICATION:   A 68 y.o. 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   Radiation therapy was completed on 10/24/22.      HISTORY OF PRESENT ILLNESS:    since last seen patient has been doing well.  The skin reaction from radiation has completely resolved and she is extremely happy with her results.    CURRENT MEDICATIONS:  Current Outpatient Medications   Medication Sig Dispense Refill    azithromycin (ZITHROMAX Z-RACHELE) 250 MG Tab Take 1 Tablet by mouth every day. 2 tabs by mouth day 1, 1 tab by mouth days 2-5 1 Tablet 0    lidocaine (XYLOCAINE) 2 % Solution Mix with equal part Maalox. 1 Tbsp q 4 hours prn mouth pain. 200 mL 3    silver sulfADIAZINE (SILVADENE) 1 % Cream Apply 1 g topically every day. 400 g 3    ibuprofen (MOTRIN) 800 MG Tab Take 1 Tablet by mouth every 8 hours as needed for Moderate Pain. 30 Tablet 3    cyclobenzaprine (FLEXERIL) 5 MG tablet Take 1 Tab by mouth 2 times a day as needed for Mild Pain. 60 Tab 3    ibuprofen (MOTRIN) 800 MG TABS Take 1 Tab by mouth 2 times a day as needed for Mild Pain. 60 Each 1    ranitidine (ZANTAC) 150 MG TABS Take 1 Tab by mouth 2 times a day. 60 Each 11    amitriptyline (ELAVIL) 25 MG TABS Take 1 Tab by mouth every evening. FOR PAIN 30 Each 3    cyclobenzaprine (FLEXERIL) 5 MG tablet Take 1 Tab by mouth 3 times a day as needed for Mild Pain. 30 Tab 0    AMITRIPTYLINE HCL 50 MG PO TABS Take 50 mg by mouth every evening.       No current facility-administered medications for this encounter.       ALLERGIES:  Penicillins, Asa [aspirin], Codeine, Tramadol, and Valium    FAMILY HISTORY:    No family history on file.[unfilled]        SOCIAL HISTORY:     reports that she has been smoking cigarettes. She started smoking about 43 years ago. She has a 11.0 pack-year smoking history. She has never used smokeless tobacco. She reports that she does not currently use  alcohol. She reports current drug use. Drug: Marijuana.    PAIN: none    REVIEW OF SYSTEMS: Is significant for   a bout of COVID where she is lost her taste and developed a little bit of asthma.  She states she has chronic aches and pains and she has had a little bit of weight gain since she has been using the steroid inhaler for her asthma that was induced by COVID.  The rest of the review of systems has been reviewed.    PHYSICAL EXAM:     ECOG PERFORMANCE STATUS:  0= Fully active, able to carry on all pre-disease performance without restriction.   Vitals:    11/06/23 1030   BP: 124/72   BP Location: Right arm   Patient Position: Sitting   BP Cuff Size: Adult   Pulse: 97   SpO2: 100%   Weight: 64.3 kg (141 lb 12.1 oz)   Pain Score: 5=Moderate Pain (arthritis pain)        GENERAL:   Well-appearing alert and oriented x3 no apparent distress   LIP: The area that was previously radiated is hardly noticeable.  It is all smooth flat essentially no induration or tenderness.  HEENT:  Pupils are equal, round, and reactive to light.  Extraocular muscles   are intact. Sclerae nonicteric.  Conjunctivae pink.  Oral cavity, tongue   protrudes midline.   NECK:  No preauricular neck supraclavicular axillary adenopathy.  LUNGS:  Clear to ascultation   HEART:  Regular rate and rhythm.  No murmur appreciated  ABDOMEN:  Soft. No evidence of hepatosplenomegaly.   EXTREMITIES:  Without Edema.  NEUROLOGIC:  Cranial nerves II through XII were intact. Normal stance and gait motor and sensory grossly within normal limits          IMPRESSION:    A 68 y.o. 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  Radiation therapy was completed on 10/24/22.   No evidence of disease    RECOMMENDATIONS:      Patient will continue her follow-up with dermatologist.  I am happy to see her on an as-needed basis.        Thank you for the opportunity to participate in her care.  If any questions or comments, please do not  hesitate in calling.      Please note that this dictation was created using voice recognition software. I have made every reasonable attempt to correct obvious errors, but I expect that there are errors of grammar and possibly content that I did not discover before finalizing the note.

## 2023-11-06 NOTE — PROGRESS NOTES
Patient was seen today in clinic with Dr. Feliciano for follow up.  Vitals signs and weight were obtained and pain assessment was completed.  Allergies and medications were reviewed with the patient.  Toxicities of treatment assessed.     Vitals/Pain:  Vitals:    11/06/23 1030   BP: 124/72   BP Location: Right arm   Patient Position: Sitting   BP Cuff Size: Adult   Pulse: 97   SpO2: 100%   Weight: 64.3 kg (141 lb 12.1 oz)   Pain Score: 5=Moderate Pain (arthritis pain)        Allergies:   Penicillins, Asa [aspirin], Codeine, Tramadol, and Valium    Current Medications:  Current Outpatient Medications   Medication Sig Dispense Refill    azithromycin (ZITHROMAX Z-RACHELE) 250 MG Tab Take 1 Tablet by mouth every day. 2 tabs by mouth day 1, 1 tab by mouth days 2-5 1 Tablet 0    lidocaine (XYLOCAINE) 2 % Solution Mix with equal part Maalox. 1 Tbsp q 4 hours prn mouth pain. 200 mL 3    silver sulfADIAZINE (SILVADENE) 1 % Cream Apply 1 g topically every day. 400 g 3    ibuprofen (MOTRIN) 800 MG Tab Take 1 Tablet by mouth every 8 hours as needed for Moderate Pain. 30 Tablet 3    cyclobenzaprine (FLEXERIL) 5 MG tablet Take 1 Tab by mouth 2 times a day as needed for Mild Pain. 60 Tab 3    ibuprofen (MOTRIN) 800 MG TABS Take 1 Tab by mouth 2 times a day as needed for Mild Pain. 60 Each 1    ranitidine (ZANTAC) 150 MG TABS Take 1 Tab by mouth 2 times a day. 60 Each 11    amitriptyline (ELAVIL) 25 MG TABS Take 1 Tab by mouth every evening. FOR PAIN 30 Each 3    cyclobenzaprine (FLEXERIL) 5 MG tablet Take 1 Tab by mouth 3 times a day as needed for Mild Pain. 30 Tab 0    AMITRIPTYLINE HCL 50 MG PO TABS Take 50 mg by mouth every evening.       No current facility-administered medications for this encounter.         PCP:  No primary care provider on file.        [facility] Ass't